# Patient Record
Sex: FEMALE | Race: BLACK OR AFRICAN AMERICAN | NOT HISPANIC OR LATINO | Employment: UNEMPLOYED | URBAN - METROPOLITAN AREA
[De-identification: names, ages, dates, MRNs, and addresses within clinical notes are randomized per-mention and may not be internally consistent; named-entity substitution may affect disease eponyms.]

---

## 2021-09-25 ENCOUNTER — APPOINTMENT (OUTPATIENT)
Dept: ULTRASOUND IMAGING | Facility: HOSPITAL | Age: 26
End: 2021-09-25

## 2021-09-25 ENCOUNTER — HOSPITAL ENCOUNTER (EMERGENCY)
Facility: HOSPITAL | Age: 26
Discharge: HOME | End: 2021-09-25
Attending: EMERGENCY MEDICINE | Admitting: EMERGENCY MEDICINE

## 2021-09-25 VITALS
TEMPERATURE: 98 F | WEIGHT: 186.29 LBS | RESPIRATION RATE: 18 BRPM | HEART RATE: 87 BPM | SYSTOLIC BLOOD PRESSURE: 106 MMHG | OXYGEN SATURATION: 98 % | DIASTOLIC BLOOD PRESSURE: 67 MMHG | HEIGHT: 61 IN | BODY MASS INDEX: 35.17 KG/M2

## 2021-09-25 DIAGNOSIS — N93.9 VAGINAL SPOTTING: ICD-10-CM

## 2021-09-25 DIAGNOSIS — Z3A.21 21 WEEKS GESTATION OF PREGNANCY: Primary | ICD-10-CM

## 2021-09-25 LAB
ABO GROUP BLD: NORMAL
ALBUMIN SERPL-MCNC: 3.2 G/DL (ref 3.5–5.2)
ALBUMIN/GLOB SERPL: 1.3 G/DL
ALP SERPL-CCNC: 92 U/L (ref 39–117)
ALT SERPL W P-5'-P-CCNC: 10 U/L (ref 1–33)
ANION GAP SERPL CALCULATED.3IONS-SCNC: 11.4 MMOL/L (ref 5–15)
AST SERPL-CCNC: 12 U/L (ref 1–32)
BASOPHILS # BLD AUTO: 0.02 10*3/MM3 (ref 0–0.2)
BASOPHILS NFR BLD AUTO: 0.2 % (ref 0–1.5)
BILIRUB SERPL-MCNC: 0.2 MG/DL (ref 0–1.2)
BILIRUB UR QL STRIP: NEGATIVE
BUN SERPL-MCNC: 9 MG/DL (ref 6–20)
BUN/CREAT SERPL: 24.3 (ref 7–25)
C TRACH RRNA CVX QL NAA+PROBE: NOT DETECTED
CALCIUM SPEC-SCNC: 8.6 MG/DL (ref 8.6–10.5)
CHLORIDE SERPL-SCNC: 105 MMOL/L (ref 98–107)
CLARITY UR: ABNORMAL
CO2 SERPL-SCNC: 20.6 MMOL/L (ref 22–29)
COLOR UR: YELLOW
CREAT SERPL-MCNC: 0.37 MG/DL (ref 0.57–1)
DEPRECATED RDW RBC AUTO: 48.9 FL (ref 37–54)
EOSINOPHIL # BLD AUTO: 0.11 10*3/MM3 (ref 0–0.4)
EOSINOPHIL NFR BLD AUTO: 1.2 % (ref 0.3–6.2)
ERYTHROCYTE [DISTWIDTH] IN BLOOD BY AUTOMATED COUNT: 15.1 % (ref 12.3–15.4)
GFR SERPL CREATININE-BSD FRML MDRD: >150 ML/MIN/1.73
GLOBULIN UR ELPH-MCNC: 2.5 GM/DL
GLUCOSE SERPL-MCNC: 106 MG/DL (ref 65–99)
GLUCOSE UR STRIP-MCNC: NEGATIVE MG/DL
HCG INTACT+B SERPL-ACNC: 3194 MIU/ML
HCT VFR BLD AUTO: 33.8 % (ref 34–46.6)
HGB BLD-MCNC: 11.4 G/DL (ref 12–15.9)
HGB UR QL STRIP.AUTO: NEGATIVE
HOLD SPECIMEN: NORMAL
HOLD SPECIMEN: NORMAL
IMM GRANULOCYTES # BLD AUTO: 0.06 10*3/MM3 (ref 0–0.05)
IMM GRANULOCYTES NFR BLD AUTO: 0.7 % (ref 0–0.5)
KETONES UR QL STRIP: NEGATIVE
LEUKOCYTE ESTERASE UR QL STRIP.AUTO: NEGATIVE
LYMPHOCYTES # BLD AUTO: 1.61 10*3/MM3 (ref 0.7–3.1)
LYMPHOCYTES NFR BLD AUTO: 17.9 % (ref 19.6–45.3)
MCH RBC QN AUTO: 30.2 PG (ref 26.6–33)
MCHC RBC AUTO-ENTMCNC: 33.7 G/DL (ref 31.5–35.7)
MCV RBC AUTO: 89.7 FL (ref 79–97)
MONOCYTES # BLD AUTO: 0.63 10*3/MM3 (ref 0.1–0.9)
MONOCYTES NFR BLD AUTO: 7 % (ref 5–12)
N GONORRHOEA RRNA SPEC QL NAA+PROBE: NOT DETECTED
NEUTROPHILS NFR BLD AUTO: 6.58 10*3/MM3 (ref 1.7–7)
NEUTROPHILS NFR BLD AUTO: 73 % (ref 42.7–76)
NITRITE UR QL STRIP: NEGATIVE
NRBC BLD AUTO-RTO: 0 /100 WBC (ref 0–0.2)
PH UR STRIP.AUTO: 7 [PH] (ref 5–8)
PLATELET # BLD AUTO: 201 10*3/MM3 (ref 140–450)
PMV BLD AUTO: 10.2 FL (ref 6–12)
POTASSIUM SERPL-SCNC: 3.6 MMOL/L (ref 3.5–5.2)
PROT SERPL-MCNC: 5.7 G/DL (ref 6–8.5)
PROT UR QL STRIP: NEGATIVE
RBC # BLD AUTO: 3.77 10*6/MM3 (ref 3.77–5.28)
RH BLD: POSITIVE
SODIUM SERPL-SCNC: 137 MMOL/L (ref 136–145)
SP GR UR STRIP: 1.02 (ref 1–1.03)
UROBILINOGEN UR QL STRIP: ABNORMAL
WBC # BLD AUTO: 9.01 10*3/MM3 (ref 3.4–10.8)
WHOLE BLOOD HOLD SPECIMEN: NORMAL
WHOLE BLOOD HOLD SPECIMEN: NORMAL

## 2021-09-25 PROCEDURE — 76815 OB US LIMITED FETUS(S): CPT

## 2021-09-25 PROCEDURE — 86901 BLOOD TYPING SEROLOGIC RH(D): CPT | Performed by: EMERGENCY MEDICINE

## 2021-09-25 PROCEDURE — 81003 URINALYSIS AUTO W/O SCOPE: CPT | Performed by: EMERGENCY MEDICINE

## 2021-09-25 PROCEDURE — 80053 COMPREHEN METABOLIC PANEL: CPT | Performed by: EMERGENCY MEDICINE

## 2021-09-25 PROCEDURE — 87591 N.GONORRHOEAE DNA AMP PROB: CPT | Performed by: EMERGENCY MEDICINE

## 2021-09-25 PROCEDURE — 85025 COMPLETE CBC W/AUTO DIFF WBC: CPT | Performed by: EMERGENCY MEDICINE

## 2021-09-25 PROCEDURE — 99283 EMERGENCY DEPT VISIT LOW MDM: CPT

## 2021-09-25 PROCEDURE — 87491 CHLMYD TRACH DNA AMP PROBE: CPT | Performed by: EMERGENCY MEDICINE

## 2021-09-25 PROCEDURE — 86900 BLOOD TYPING SEROLOGIC ABO: CPT | Performed by: EMERGENCY MEDICINE

## 2021-09-25 PROCEDURE — 84702 CHORIONIC GONADOTROPIN TEST: CPT | Performed by: EMERGENCY MEDICINE

## 2021-09-25 RX ORDER — SODIUM CHLORIDE 0.9 % (FLUSH) 0.9 %
10 SYRINGE (ML) INJECTION AS NEEDED
Status: DISCONTINUED | OUTPATIENT
Start: 2021-09-25 | End: 2021-09-25 | Stop reason: HOSPADM

## 2021-09-25 NOTE — ED PROVIDER NOTES
"Time: 11:32 AM EDT  Arrived by: private car  Chief Complaint: Vaginal Bleeding  History provided by: Patient  History is limited by: N/A     History of Present Illness:  Patient is a 26 y.o. year old female that presents to the emergency department with a mild amount of intermittent vaginal bleeding that began last night at 2000. The bleeding is described as \"spotting.\"  About 5 hours after the bleeding began, she began to experience abdominal cramping. She has had a fever and vaginal discharge but no nausea, vomiting, or diarrhea.    Pt is currently in rehab. She was recently diagnosed with PID and was given 1 injection of antibiotics. The patient had a positive pregnancy test in May 2021 but no ultrasound.      Vaginal Bleeding - Pregnant  Quality:  Spotting  Severity:  Mild  Duration: Since 2000 last night.  Timing:  Intermittent  Pregnancy confirmed by ultrasound: no    Associated symptoms: abdominal pain (cramping), fever and vaginal discharge    Associated symptoms: no nausea        Patient Care Team  Primary Care Provider: Provider, No Known    Past Medical History:     Allergies   Allergen Reactions   • Celexa [Citalopram] Anaphylaxis and Hives     History reviewed. No pertinent past medical history.  History reviewed. No pertinent surgical history.  History reviewed. No pertinent family history.    Home Medications:  Prior to Admission medications    Not on File        Social History:   Social History     Tobacco Use   • Smoking status: Not on file   Substance Use Topics   • Alcohol use: Not on file   • Drug use: Not on file     Recent travel: not applicable     Review of Systems:  Review of Systems   Constitutional: Positive for fever. Negative for chills.   HENT: Negative for congestion, ear pain and sore throat.    Eyes: Negative for pain.   Respiratory: Negative for cough, chest tightness and shortness of breath.    Cardiovascular: Negative for chest pain.   Gastrointestinal: Positive for abdominal pain " "(cramping). Negative for diarrhea, nausea and vomiting.   Genitourinary: Positive for vaginal bleeding and vaginal discharge. Negative for flank pain and hematuria.   Musculoskeletal: Negative for joint swelling.   Skin: Negative for pallor.   Neurological: Negative for seizures and headaches.   All other systems reviewed and are negative.       Physical Exam:  /67   Pulse 87   Temp 98 °F (36.7 °C) (Oral)   Resp 18   Ht 154.9 cm (61\")   Wt 84.5 kg (186 lb 4.6 oz)   LMP 04/26/2021 (Approximate)   SpO2 98%   BMI 35.20 kg/m²     Physical Exam  Vitals and nursing note reviewed.   Constitutional:       General: She is not in acute distress.     Appearance: Normal appearance. She is not toxic-appearing.   HENT:      Head: Normocephalic and atraumatic.      Mouth/Throat:      Mouth: Mucous membranes are moist.   Eyes:      Extraocular Movements: Extraocular movements intact.      Pupils: Pupils are equal, round, and reactive to light.   Cardiovascular:      Rate and Rhythm: Normal rate and regular rhythm.      Pulses: Normal pulses.      Heart sounds: Normal heart sounds.   Pulmonary:      Effort: Pulmonary effort is normal. No respiratory distress.      Breath sounds: Normal breath sounds.   Abdominal:      General: Abdomen is flat.      Palpations: Abdomen is soft.      Tenderness: There is no abdominal tenderness.      Comments: Gravid uterus to the umbilicus.    Musculoskeletal:         General: Normal range of motion.      Cervical back: Normal range of motion and neck supple.   Skin:     General: Skin is warm and dry.   Neurological:      Mental Status: She is alert and oriented to person, place, and time. Mental status is at baseline.                Medications in the Emergency Department:  Medications - No data to display     Labs  Lab Results (last 24 hours)     Procedure Component Value Units Date/Time    CBC & Differential [[id number]]  (Abnormal) Collected: 09/25/21 1059    Specimen: Blood Updated: " 09/25/21 1117    Narrative:      The following orders were created for panel order CBC & Differential.  Procedure                               Abnormality         Status                     ---------                               -----------         ------                     CBC Auto Differential[[id number]]        Abnormal            Final result                 Please view results for these tests on the individual orders.    hCG, Quantitative, Pregnancy [[id number]] Collected: 09/25/21 1059    Specimen: Blood Updated: 09/25/21 1156     HCG Quantitative 3,194.00 mIU/mL     Narrative:      HCG Ranges by Gestational Age    Females - non-pregnant premenopausal   </= 1mIU/mL HCG  Females - postmenopausal               </= 7mIU/mL HCG    3 Weeks       5.4   -      72 mIU/mL  4 Weeks      10.2   -     708 mIU/mL  5 Weeks       217   -   8,245 mIU/mL  6 Weeks       152   -  32,177 mIU/mL  7 Weeks     4,059   - 153,767 mIU/mL  8 Weeks    31,366   - 149,094 mIU/mL  9 Weeks    59,109   - 135,901 mIU/mL  10 Weeks   44,186   - 170,409 mIU/mL  12 Weeks   27,107   - 201,615 mIU/mL  14 Weeks   24,302   -  93,646 mIU/mL  15 Weeks   12,540   -  69,747 mIU/mL  16 Weeks    8,904   -  55,332 mIU/mL  17 Weeks    8,240   -  51,793 mIU/mL  18 Weeks    9,649   -  55,271 mIU/mL    Results may be falsely decreased if patient taking Biotin.      CBC Auto Differential [[id number]]  (Abnormal) Collected: 09/25/21 1059    Specimen: Blood Updated: 09/25/21 1117     WBC 9.01 10*3/mm3      RBC 3.77 10*6/mm3      Hemoglobin 11.4 g/dL      Hematocrit 33.8 %      MCV 89.7 fL      MCH 30.2 pg      MCHC 33.7 g/dL      RDW 15.1 %      RDW-SD 48.9 fl      MPV 10.2 fL      Platelets 201 10*3/mm3      Neutrophil % 73.0 %      Lymphocyte % 17.9 %      Monocyte % 7.0 %      Eosinophil % 1.2 %      Basophil % 0.2 %      Immature Grans % 0.7 %      Neutrophils, Absolute 6.58 10*3/mm3      Lymphocytes, Absolute 1.61 10*3/mm3      Monocytes, Absolute 0.63  10*3/mm3      Eosinophils, Absolute 0.11 10*3/mm3      Basophils, Absolute 0.02 10*3/mm3      Immature Grans, Absolute 0.06 10*3/mm3      nRBC 0.0 /100 WBC     Comprehensive Metabolic Panel [[id number]]  (Abnormal) Collected: 09/25/21 1059    Specimen: Blood Updated: 09/25/21 1153     Glucose 106 mg/dL      BUN 9 mg/dL      Creatinine 0.37 mg/dL      Sodium 137 mmol/L      Potassium 3.6 mmol/L      Chloride 105 mmol/L      CO2 20.6 mmol/L      Calcium 8.6 mg/dL      Total Protein 5.7 g/dL      Albumin 3.20 g/dL      ALT (SGPT) 10 U/L      AST (SGOT) 12 U/L      Alkaline Phosphatase 92 U/L      Total Bilirubin 0.2 mg/dL      eGFR  African Amer >150 mL/min/1.73      Globulin 2.5 gm/dL      A/G Ratio 1.3 g/dL      BUN/Creatinine Ratio 24.3     Anion Gap 11.4 mmol/L     Narrative:      GFR Normal >60  Chronic Kidney Disease <60  Kidney Failure <15      Urinalysis With Culture If Indicated - Urine, Clean Catch [[id number]]  (Abnormal) Collected: 09/25/21 1129    Specimen: Urine, Clean Catch Updated: 09/25/21 1204     Color, UA Yellow     Appearance, UA Cloudy     pH, UA 7.0     Specific Gravity, UA 1.018     Glucose, UA Negative     Ketones, UA Negative     Bilirubin, UA Negative     Blood, UA Negative     Protein, UA Negative     Leuk Esterase, UA Negative     Nitrite, UA Negative     Urobilinogen, UA 0.2 E.U./dL    Narrative:      Urine microscopic not indicated.    Chlamydia trachomatis, Neisseria gonorrhoeae, PCR - Urine, Urine, Clean Catch [[id number]]  (Normal) Collected: 09/25/21 1130    Specimen: Urine, Clean Catch Updated: 09/25/21 1439     Chlamydia DNA by PCR Not Detected     Neisseria gonorrhoeae by PCR Not Detected           Imaging:  US Ob Limited 1 + Fetuses    Result Date: 9/25/2021  PROCEDURE: US OB LIMITED 1 + FETUSES  COMPARISON:  None INDICATIONS: preg, pain, vag bleed  TECHNIQUE: A complete sonographic examination was performed for obstetrical and fetal evaluation.   FINDINGS:  FETAL NUMBER: Single.   POSITION: Cephalic transitioning to transverse by the in the exam.  AMNIOTIC FLUID VOLUME: Normal for age with a DVP of 7.3 cm. PLACENTA LOCATION: Fundal BIPARIETAL DIAMETER: 5.1 cm equal to 21 weeks 4 days HEAD CIRCUMFERENCE: 17.7 cm equal to 20 weeks 2 days ABD CIRCUMFERENCE: 16.1 cm equal to 21 weeks 2 days FEMUR LENGTH: 3.5 cm equal to 21 weeks 0 days ESTIMATED FETAL WEIGHT: 391 g +/-57 g or 0 lb 14 oz +/-2 oz  HEART RATE: 143 bpm FACE AND LIPS: Not adequately identified FETAL ANATOMY: The following structures are normal for fetal age unless specified below:  Ventricles, posterior fossa, spine, heart, LVOT, RVOT, thorax, abdomen, cord, stomach, kidneys, and bladder. ABNORMALITIES/OTHER: None. CLINICAL GA: Unknown. CLINICAL GIBRAN: Unknown. ULTRASOUND GA: 21 weeks 1 day ULTRASOUND GIBRAN: 2/4/2022  CONCLUSION: Unremarkable fetal sonogram     KAUSHIK MERRILL MD       Electronically Signed and Approved By: KAUSHIK MERRILL MD on 9/25/2021 at 12:29               Procedures:  Procedures    Progress  ED Course as of Sep 25 2036   Sat Sep 25, 2021   1332 Upon reevaluation, the patient is in stable condition.     [LG]      ED Course User Index  [LG] Irvign Paulson                            Medical Decision Making:  MDM  Number of Diagnoses or Management Options     Amount and/or Complexity of Data Reviewed  Clinical lab tests: reviewed  Tests in the radiology section of CPT®: reviewed  Review and summarize past medical records: yes (Pt has no significant past visits or evidence of prior prenatal care. )         Final diagnoses:   21 weeks gestation of pregnancy   Vaginal spotting        Disposition:  ED Disposition     ED Disposition Condition Comment    Discharge Stable           This medical record created using voice recognition software and may contain unintended errors.    Documentation assistance provided by Irving Paulson acting as scribe for Rio Gregorio DO. Information recorded by the scribe was done at my direction  and has been verified and validated by me.        Irving Paulson  09/25/21 1322       Rio Gregorio DO  09/25/21 2036

## 2023-10-30 LAB
EXTERNAL HEMATOCRIT: 40 %
EXTERNAL HEMOGLOBIN: 13.5 G/DL
EXTERNAL HEPATITIS B SURFACE ANTIGEN: NEGATIVE
EXTERNAL PLATELET COUNT: 286 K/ΜL
EXTERNAL SYPHILIS RPR SCREEN: NORMAL
HCV AB S/CO SERPL IA: NORMAL
HIV 1+2 AB+HIV1 P24 AG SERPL QL IA: NORMAL

## 2023-12-27 LAB
BUPRENORPHINE SERPL-MCNC: NEGATIVE NG/ML
CANNABINOIDS SERPL QL: NEGATIVE
COCAINE SERPL CFM-MCNC: NEGATIVE NG/ML
EXTERNAL AMPHETAMINE SCREEN URINE: NEGATIVE
EXTERNAL BARBITURATE SCREEN URINE: NEGATIVE
EXTERNAL BENZODIAZEPINE SCREEN URINE: NEGATIVE
EXTERNAL THYROID STIMULATING HORMONE: 1.72 M[IU]/ML
HCV AB S/CO SERPL IA: NORMAL
OPIATES UR QL: NEGATIVE
OXYCODONE UR QL SCN: NEGATIVE

## 2024-01-03 ENCOUNTER — HOSPITAL ENCOUNTER (OUTPATIENT)
Facility: HOSPITAL | Age: 29
Discharge: HOME | End: 2024-01-03
Attending: OBSTETRICS & GYNECOLOGY | Admitting: OBSTETRICS & GYNECOLOGY
Payer: MEDICAID

## 2024-01-03 VITALS
SYSTOLIC BLOOD PRESSURE: 114 MMHG | DIASTOLIC BLOOD PRESSURE: 52 MMHG | HEART RATE: 90 BPM | OXYGEN SATURATION: 100 % | RESPIRATION RATE: 18 BRPM

## 2024-01-03 LAB
ALBUMIN SERPL-MCNC: 3.1 G/DL (ref 3.5–5.2)
ALBUMIN/GLOB SERPL: 1 G/DL
ALP SERPL-CCNC: 107 U/L (ref 39–117)
ALT SERPL W P-5'-P-CCNC: 17 U/L (ref 1–33)
ANION GAP SERPL CALCULATED.3IONS-SCNC: 9 MMOL/L (ref 5–15)
AST SERPL-CCNC: 18 U/L (ref 1–32)
BILIRUB SERPL-MCNC: <0.2 MG/DL (ref 0–1.2)
BUN SERPL-MCNC: 3 MG/DL (ref 6–20)
BUN/CREAT SERPL: 7 (ref 7–25)
CALCIUM SPEC-SCNC: 8.7 MG/DL (ref 8.6–10.5)
CHLORIDE SERPL-SCNC: 102 MMOL/L (ref 98–107)
CO2 SERPL-SCNC: 23 MMOL/L (ref 22–29)
CREAT SERPL-MCNC: 0.43 MG/DL (ref 0.57–1)
CREAT UR-MCNC: 95.9 MG/DL
DEPRECATED RDW RBC AUTO: 44.5 FL (ref 37–54)
EGFRCR SERPLBLD CKD-EPI 2021: 136.1 ML/MIN/1.73
ERYTHROCYTE [DISTWIDTH] IN BLOOD BY AUTOMATED COUNT: 13.7 % (ref 12.3–15.4)
GLOBULIN UR ELPH-MCNC: 3.1 GM/DL
GLUCOSE SERPL-MCNC: 75 MG/DL (ref 65–99)
HCT VFR BLD AUTO: 31.1 % (ref 34–46.6)
HGB BLD-MCNC: 10.2 G/DL (ref 12–15.9)
MCH RBC QN AUTO: 29.1 PG (ref 26.6–33)
MCHC RBC AUTO-ENTMCNC: 32.8 G/DL (ref 31.5–35.7)
MCV RBC AUTO: 88.9 FL (ref 79–97)
PLATELET # BLD AUTO: 236 10*3/MM3 (ref 140–450)
PMV BLD AUTO: 10.3 FL (ref 6–12)
POTASSIUM SERPL-SCNC: 3.7 MMOL/L (ref 3.5–5.2)
PROT ?TM UR-MCNC: 6.5 MG/DL
PROT SERPL-MCNC: 6.2 G/DL (ref 6–8.5)
PROT/CREAT UR: 0.07 MG/G{CREAT}
RBC # BLD AUTO: 3.5 10*6/MM3 (ref 3.77–5.28)
SODIUM SERPL-SCNC: 134 MMOL/L (ref 136–145)
WBC NRBC COR # BLD AUTO: 9.19 10*3/MM3 (ref 3.4–10.8)

## 2024-01-03 PROCEDURE — 85027 COMPLETE CBC AUTOMATED: CPT | Performed by: OBSTETRICS & GYNECOLOGY

## 2024-01-03 PROCEDURE — 80053 COMPREHEN METABOLIC PANEL: CPT | Performed by: OBSTETRICS & GYNECOLOGY

## 2024-01-03 PROCEDURE — 84156 ASSAY OF PROTEIN URINE: CPT | Performed by: OBSTETRICS & GYNECOLOGY

## 2024-01-03 PROCEDURE — G0463 HOSPITAL OUTPT CLINIC VISIT: HCPCS

## 2024-01-03 PROCEDURE — 82570 ASSAY OF URINE CREATININE: CPT | Performed by: OBSTETRICS & GYNECOLOGY

## 2024-01-03 NOTE — LETTER
January 3, 2024     Patient: Vicky Melendrez   YOB: 1995   Date of Visit: 1/3/2024       To Whom It May Concern:    Vicky Melendrez was seen in Labor and Delivery today and discharged.           Sincerely,  Gemma ANDREWS

## 2024-01-03 NOTE — H&P
VAISHNAVI Eduardo  Obstetric History and Physical    Chief Complaint   Patient presents with    swelling in legs     Swelling in lower legs for last two weeks.        Subjective     HPI:    Patient is a 28 y.o. female  currently at 25w1d, who presents to OB ED with/for edema. This has been going on for about two weeks. She was told to use compression socks but there has been no improvement. She denies pain or redness in either leg. There are no fetal complaints. Baby is moving well. She denies headache, RUQ pain.    She has been seeing physician in [facility] for her prenatal care.  The pregnancy has been complicated by substance abuse (sober since pregnancy confirmed). She lives in a facility for women with drug abuse.    The following portions of the patients history were reviewed and updated as appropriate:   current medications, allergies, past medical history, past surgical history, past family history, past social history and current problem list.     Prenatal Information:  Prenatal Results       Initial Prenatal Labs       Test Value Reference Range Date Time    ABO  O   21 1129    Rh  Positive   21 1129    Gonorrhea  Not Detected  Not Detected  21 1130    Chlamydia  Not Detected  Not Detected  21 1130                2nd and 3rd Trimester       Test Value Reference Range Date Time    Hemoglobin (repeated)  10.2 g/dL 12.0 - 15.9 24 0948    Hematocrit (repeated)  31.1 % 34.0 - 46.6 24 0948    Platelets   236 10*3/mm3 140 - 450 24 0948                Past OB History:     OB History    Para Term  AB Living   6 5 4 1 0 5   SAB IAB Ectopic Molar Multiple Live Births   0 0 0 0 0 5      # Outcome Date GA Lbr Braden/2nd Weight Sex Delivery Anes PTL Lv   6 Current            5 Term 22 38w0d  2722 g (6 lb) M Vag-Spont   MALA   4  21 32w0d  2353 g (5 lb 3 oz) M Vag-Spont   MALA      Complications:  labor   3 Term 19 38w0d  2608 g (5 lb  12 oz) F Vag-Spont   MALA   2 Term 07/31/17 38w0d  3175 g (7 lb) M Vag-Spont   MALA   1 Term 05/08/16 38w0d  3076 g (6 lb 12.5 oz) F Vag-Spont   MALA       Past Medical History: Past Medical History:   Diagnosis Date    Anxiety     Depression     Genital herpes     GERD (gastroesophageal reflux disease)     Substance abuse       Past Surgical History History reviewed. No pertinent surgical history.   Family History: Family History   Problem Relation Age of Onset    Hypertension Mother     Diabetes Sister       Social History:  reports that she has been smoking cigarettes. She has been smoking an average of .5 packs per day. She has been exposed to tobacco smoke. She has never used smokeless tobacco.   reports that she does not currently use alcohol.   reports that she does not currently use drugs.        General ROS: Pertinent items are noted in HPI  Home Medications:  Prenatal, bacitracin, buprenorphine, busPIRone, cefdinir, diphenhydrAMINE, docusate sodium, famotidine, ferrous sulfate, fluticasone, hydrOXYzine pamoate, omeprazole, ondansetron ODT, and sertraline    Allergies:  Allergies   Allergen Reactions    Celexa [Citalopram] Anaphylaxis and Hives       Objective       Vital Signs Range for the last 24 hours  Temperature:     Temp Source:     BP: BP: (114-130)/(52-54) 114/52   Pulse: Heart Rate:  [76-90] 90   Respirations: Resp:  [18] 18   SPO2: SpO2:  [98 %-100 %] 100 %     Physical Examination:   General appearance - alert, well appearing, and in no distress  Mental status - alert, oriented to person, place, and time  Chest - clear to auscultation  Heart - normal rate, regular rhythm  Abdomen - soft, nontender, nondistended  Neurological - alert, oriented, normal speech  Extremities - Edema 2+ to mid calf; DTR 2+  Skin - normal coloration and turgor, no suspicious skin lesions noted     Odell: no contractions   NST: FHR 150s      Lab Results (last 24 hours)       Procedure Component Value Units Date/Time    CBC  (No Diff) [[id number]]  (Abnormal) Collected: 01/03/24 0948    Specimen: Blood from Arm, Right Updated: 01/03/24 1021     WBC 9.19 10*3/mm3      RBC 3.50 10*6/mm3      Hemoglobin 10.2 g/dL      Hematocrit 31.1 %      MCV 88.9 fL      MCH 29.1 pg      MCHC 32.8 g/dL      RDW 13.7 %      RDW-SD 44.5 fl      MPV 10.3 fL      Platelets 236 10*3/mm3     Comprehensive Metabolic Panel [[id number]]  (Abnormal) Collected: 01/03/24 0948    Specimen: Blood from Arm, Right Updated: 01/03/24 1050     Glucose 75 mg/dL      BUN 3 mg/dL      Creatinine 0.43 mg/dL      Sodium 134 mmol/L      Potassium 3.7 mmol/L      Chloride 102 mmol/L      CO2 23.0 mmol/L      Calcium 8.7 mg/dL      Total Protein 6.2 g/dL      Albumin 3.1 g/dL      ALT (SGPT) 17 U/L      AST (SGOT) 18 U/L      Alkaline Phosphatase 107 U/L      Total Bilirubin <0.2 mg/dL      Globulin 3.1 gm/dL      A/G Ratio 1.0 g/dL      BUN/Creatinine Ratio 7.0     Anion Gap 9.0 mmol/L      eGFR 136.1 mL/min/1.73     Narrative:      GFR Normal >60  Chronic Kidney Disease <60  Kidney Failure <15      Protein / Creatinine Ratio, Urine - Urine, Clean Catch [[id number]] Collected: 01/03/24 1035    Specimen: Urine, Clean Catch Updated: 01/03/24 1114     Creatinine, Urine 95.9 mg/dL      Total Protein, Urine 6.5 mg/dL      Protein/Creatinine Ratio, Urine 0.07            Assessment & Plan     Assessment:  -  Intrauterine pregnancy at 25w1d gestation who presents for: 2 wks of lower extremity edema. No relief with compression. She has no other concerns     Plan:  Edema  No other symptoms and exam negative but for bilateral edema  Labs all normal  No sign of DVT either leg  Recommend continue with compression hose and elevation when sitting or lying down    2.  Patient does state she will deliver here even though she gets care in [place]. We requested her records and these have been scanned to media tab.       - Plan of care has been reviewed with patient and patient agrees.   -  Risks, benefits of treatment plan have been discussed.  - All questions have been answered.        Electronically signed by Yue Persaud MD, 01/03/24, 12:04 PM EST.

## 2024-01-03 NOTE — NURSING NOTE
Pt to unit with c/o swelling in lower legs for 2 weeks. Pt denies any problems with this pregnancy. Pt denies any abdominal pain, discomfort or vaginal discharge. Pt reports + fetal movement. Pt reports she receives prenatal care in [place].

## 2024-01-03 NOTE — NON STRESS TEST
Obstetrical Non-stress Test Interpretation     Name:  Vicky Melendrez  MRN: [id number]    28 y.o. female  at 25w1d    Indication: swelling in legs for 2 weeks      Fetal Assessment  Fetal Movement: active  Fetal HR Assessment Method: external  Fetal HR (beats/min): 140  Fetal HR Baseline: normal range  Fetal HR Variability: other (see comments) (appropriate for gestational age)  Additional Documentation:  (appropriate for gestational age)    /52 (BP Location: Right arm, Patient Position: Lying)   Pulse 90   Resp 18   SpO2 100%     Reason for test: OB Triage (swelling in legs for 2 weeks)  Date of Test: 1/3/2024  Time frame of test: 8647=9456  RN NST Interpretation:  (appropritate for gestational age)      Gemma Barry RN  1/3/2024  12:27 EST

## 2024-01-05 ENCOUNTER — TRANSCRIBE ORDERS (OUTPATIENT)
Dept: ADMINISTRATIVE | Facility: HOSPITAL | Age: 29
End: 2024-01-05
Payer: MEDICAID

## 2024-01-05 ENCOUNTER — LAB (OUTPATIENT)
Dept: LAB | Facility: HOSPITAL | Age: 29
End: 2024-01-05
Payer: MEDICAID

## 2024-01-05 DIAGNOSIS — Z79.899 ENCOUNTER FOR LONG-TERM (CURRENT) USE OF OTHER MEDICATIONS: Primary | ICD-10-CM

## 2024-01-05 DIAGNOSIS — Z79.899 ENCOUNTER FOR LONG-TERM (CURRENT) USE OF OTHER MEDICATIONS: ICD-10-CM

## 2024-01-05 LAB
ALBUMIN SERPL-MCNC: 3.6 G/DL (ref 3.5–5.2)
ALBUMIN/GLOB SERPL: 1.1 G/DL
ALP SERPL-CCNC: 118 U/L (ref 39–117)
ALT SERPL W P-5'-P-CCNC: 23 U/L (ref 1–33)
ANION GAP SERPL CALCULATED.3IONS-SCNC: 13.2 MMOL/L (ref 5–15)
AST SERPL-CCNC: 22 U/L (ref 1–32)
BASOPHILS # BLD AUTO: 0.02 10*3/MM3 (ref 0–0.2)
BASOPHILS NFR BLD AUTO: 0.2 % (ref 0–1.5)
BILIRUB SERPL-MCNC: <0.2 MG/DL (ref 0–1.2)
BUN SERPL-MCNC: 5 MG/DL (ref 6–20)
BUN/CREAT SERPL: 10.9 (ref 7–25)
CALCIUM SPEC-SCNC: 9.5 MG/DL (ref 8.6–10.5)
CHLORIDE SERPL-SCNC: 102 MMOL/L (ref 98–107)
CO2 SERPL-SCNC: 21.8 MMOL/L (ref 22–29)
CREAT SERPL-MCNC: 0.46 MG/DL (ref 0.57–1)
DEPRECATED RDW RBC AUTO: 38.7 FL (ref 37–54)
EGFRCR SERPLBLD CKD-EPI 2021: 133.9 ML/MIN/1.73
EOSINOPHIL # BLD AUTO: 0.21 10*3/MM3 (ref 0–0.4)
EOSINOPHIL NFR BLD AUTO: 1.9 % (ref 0.3–6.2)
ERYTHROCYTE [DISTWIDTH] IN BLOOD BY AUTOMATED COUNT: 12.5 % (ref 12.3–15.4)
GLOBULIN UR ELPH-MCNC: 3.3 GM/DL
GLUCOSE SERPL-MCNC: 97 MG/DL (ref 65–99)
HCT VFR BLD AUTO: 31.7 % (ref 34–46.6)
HGB BLD-MCNC: 10.5 G/DL (ref 12–15.9)
IMM GRANULOCYTES # BLD AUTO: 0.05 10*3/MM3 (ref 0–0.05)
IMM GRANULOCYTES NFR BLD AUTO: 0.4 % (ref 0–0.5)
LYMPHOCYTES # BLD AUTO: 2.27 10*3/MM3 (ref 0.7–3.1)
LYMPHOCYTES NFR BLD AUTO: 20.3 % (ref 19.6–45.3)
MCH RBC QN AUTO: 28.3 PG (ref 26.6–33)
MCHC RBC AUTO-ENTMCNC: 33.1 G/DL (ref 31.5–35.7)
MCV RBC AUTO: 85.4 FL (ref 79–97)
MONOCYTES # BLD AUTO: 0.68 10*3/MM3 (ref 0.1–0.9)
MONOCYTES NFR BLD AUTO: 6.1 % (ref 5–12)
NEUTROPHILS NFR BLD AUTO: 7.94 10*3/MM3 (ref 1.7–7)
NEUTROPHILS NFR BLD AUTO: 71.1 % (ref 42.7–76)
NRBC BLD AUTO-RTO: 0.1 /100 WBC (ref 0–0.2)
PLATELET # BLD AUTO: 271 10*3/MM3 (ref 140–450)
PMV BLD AUTO: 10.7 FL (ref 6–12)
POTASSIUM SERPL-SCNC: 3.5 MMOL/L (ref 3.5–5.2)
PROT SERPL-MCNC: 6.9 G/DL (ref 6–8.5)
RBC # BLD AUTO: 3.71 10*6/MM3 (ref 3.77–5.28)
SODIUM SERPL-SCNC: 137 MMOL/L (ref 136–145)
WBC NRBC COR # BLD AUTO: 11.17 10*3/MM3 (ref 3.4–10.8)

## 2024-01-05 PROCEDURE — 80053 COMPREHEN METABOLIC PANEL: CPT

## 2024-01-05 PROCEDURE — 85025 COMPLETE CBC W/AUTO DIFF WBC: CPT

## 2024-01-05 PROCEDURE — 36415 COLL VENOUS BLD VENIPUNCTURE: CPT

## 2024-01-24 LAB
EXTERNAL HEMATOCRIT: 34 %
EXTERNAL HEMOGLOBIN: 11.4 G/DL
PLATELET # BLD AUTO: 231 10*3/UL

## 2024-02-28 ENCOUNTER — TELEPHONE (OUTPATIENT)
Dept: OBSTETRICS AND GYNECOLOGY | Facility: CLINIC | Age: 29
End: 2024-02-28
Payer: MEDICAID

## 2024-02-29 ENCOUNTER — TELEPHONE (OUTPATIENT)
Dept: OBSTETRICS AND GYNECOLOGY | Facility: CLINIC | Age: 29
End: 2024-02-29

## 2024-02-29 ENCOUNTER — INITIAL PRENATAL (OUTPATIENT)
Dept: OBSTETRICS AND GYNECOLOGY | Facility: CLINIC | Age: 29
End: 2024-02-29
Payer: MEDICAID

## 2024-02-29 VITALS — DIASTOLIC BLOOD PRESSURE: 56 MMHG | WEIGHT: 227.2 LBS | SYSTOLIC BLOOD PRESSURE: 108 MMHG | BODY MASS INDEX: 42.93 KG/M2

## 2024-02-29 DIAGNOSIS — Z34.93 PRENATAL CARE, THIRD TRIMESTER: Primary | ICD-10-CM

## 2024-02-29 DIAGNOSIS — Z3A.33 33 WEEKS GESTATION OF PREGNANCY: ICD-10-CM

## 2024-02-29 DIAGNOSIS — R33.9 INCOMPLETE EMPTYING OF BLADDER: ICD-10-CM

## 2024-02-29 DIAGNOSIS — Z23 NEED FOR TDAP VACCINATION: ICD-10-CM

## 2024-02-29 LAB
BILIRUB BLD-MCNC: NEGATIVE MG/DL
CLARITY, POC: CLEAR
COLOR UR: YELLOW
GLUCOSE UR STRIP-MCNC: NEGATIVE MG/DL
KETONES UR QL: NEGATIVE
LEUKOCYTE EST, POC: NEGATIVE
NITRITE UR-MCNC: NEGATIVE MG/ML
PH UR: 6 [PH] (ref 5–8)
PROT UR STRIP-MCNC: NEGATIVE MG/DL
RBC # UR STRIP: NEGATIVE /UL
SP GR UR: 1.02 (ref 1–1.03)
UROBILINOGEN UR QL: NORMAL

## 2024-02-29 RX ORDER — ONDANSETRON 4 MG/1
4 TABLET, ORALLY DISINTEGRATING ORAL EVERY 8 HOURS PRN
Qty: 30 TABLET | Refills: 4 | Status: SHIPPED | OUTPATIENT
Start: 2024-02-29

## 2024-02-29 RX ORDER — DIPHENHYDRAMINE HYDROCHLORIDE 25 MG/1
25 CAPSULE ORAL
Qty: 30 CAPSULE | Refills: 3 | Status: SHIPPED | OUTPATIENT
Start: 2024-02-29

## 2024-02-29 RX ORDER — DOXYLAMINE SUCCINATE 25 MG/1
25 TABLET ORAL
Qty: 30 TABLET | Refills: 3 | Status: SHIPPED | OUTPATIENT
Start: 2024-02-29

## 2024-02-29 RX ORDER — NICOTINE 21 MG/24HR
PATCH, TRANSDERMAL 24 HOURS TRANSDERMAL
COMMUNITY
Start: 2024-01-15

## 2024-02-29 RX ORDER — ALBUTEROL SULFATE 90 UG/1
AEROSOL, METERED RESPIRATORY (INHALATION) EVERY 6 HOURS SCHEDULED
COMMUNITY

## 2024-02-29 RX ORDER — GUAIFENESIN 600 MG/1
TABLET, EXTENDED RELEASE ORAL
COMMUNITY
Start: 2024-01-12

## 2024-02-29 RX ORDER — GUAIFENESIN, DEXTROMETHORPHAN HBR 600; 30 MG/1; MG/1
TABLET ORAL
COMMUNITY
Start: 2023-11-13

## 2024-02-29 RX ORDER — PROMETHAZINE HYDROCHLORIDE 25 MG/1
1 TABLET ORAL
COMMUNITY
Start: 2023-12-07

## 2024-02-29 RX ORDER — PSEUDOEPHED/ACETAMINOPH/DIPHEN 30MG-500MG
TABLET ORAL
COMMUNITY
Start: 2024-02-21

## 2024-02-29 RX ORDER — HYDROXYZINE PAMOATE 25 MG/1
50 CAPSULE ORAL 3 TIMES DAILY PRN
Qty: 90 CAPSULE | Refills: 4 | Status: SHIPPED | OUTPATIENT
Start: 2024-02-29 | End: 2024-05-29

## 2024-02-29 RX ORDER — NALOXONE HYDROCHLORIDE 4 MG/.1ML
SPRAY NASAL
COMMUNITY
Start: 2024-01-17

## 2024-02-29 RX ORDER — DOCUSATE SODIUM 100 MG/1
200 CAPSULE, LIQUID FILLED ORAL 2 TIMES DAILY
Qty: 90 CAPSULE | Refills: 3 | Status: SHIPPED | OUTPATIENT
Start: 2024-02-29

## 2024-02-29 RX ORDER — DOXYLAMINE SUCCINATE 25 MG/1
25 TABLET ORAL
COMMUNITY
Start: 2024-02-09 | End: 2024-02-29 | Stop reason: SDUPTHER

## 2024-02-29 RX ORDER — BUSPIRONE HYDROCHLORIDE 10 MG/1
10 TABLET ORAL 3 TIMES DAILY
Qty: 90 TABLET | Refills: 3 | Status: SHIPPED | OUTPATIENT
Start: 2024-02-29

## 2024-02-29 NOTE — PROGRESS NOTES
"    Transfer OB visit     CC- Here for care of pregnancy        Vicky Melendrez is a 28 y.o. female, , who presents to transfer OB care.  No LMP recorded. Patient is pregnant..   Her GIBRAN is 2024, Date entered prior to episode creation.   Current GA is 33w2d.     She was last seen at [facility] at 28 weeks gestation.  Her pregnancy records are in the patient's chart and have been reviewed .  Her blood type is O+.  Thus far her pregnancy has been complicated by: HSV, anxiety/depression, substance abuse, smoker    OB History    Para Term  AB Living   6 5 4 1 0 5   SAB IAB Ectopic Molar Multiple Live Births   0 0 0 0 0 5      # Outcome Date GA Lbr Braden/2nd Weight Sex Delivery Anes PTL Lv   6 Current            5 Term 22 39w0d  2722 g (6 lb) M Vag-Spont   MALA   4  21 32w0d  2353 g (5 lb 3 oz) M Vag-Spont   MALA      Complications:  labor   3 Term 19 38w0d  2608 g (5 lb 12 oz) F Vag-Spont   MALA   2 Term 17 38w0d  3175 g (7 lb) M Vag-Spont   MALA   1 Term 16 38w0d  3076 g (6 lb 12.5 oz) F Vag-Spont   MALA      Complications: [place]itter delivered by vacuum extraction        Prior obstetric issues: vacuum assisted delivery for G1,  labor for G4 (delivery at 32 weeks)  Patient's past medical history is significant for:  asthma, HSV, anxiety/depression, GERD, hx substance abuse .  Family history of genetic issues (includes FOB): none  Varicella Hx - unknown   Prior testing for Cystic Fibrosis Carrier or Sickle Cell Trait - none  Prepregnancy BMI - Body mass index is 42.93 kg/m².  History of STD: yes HSV  Hx of HSV for patient or partner: yes - last outbreak has been \"years ago\"  Ultrasound Today: no      Additional Pertinent History   Last Pap: 10/17/23 Result: negative HPV: negative     Last Completed Pap Smear            PAP SMEAR (Every 3 Years) Next due on 10/17/2026      10/17/2023  SCANNED - PAP SMEAR              "     History of abnormal Pap smear: no    Feelings of Anxiety or Depression:  controlled, although she hasn't had her medication in a week and is starting to feel a difference  Tobacco Usage?: Yes Vicky Melendrez  reports that she has been smoking cigarettes. She started smoking about 9 years ago. She has been smoking an average of .5 packs per day. She has been exposed to tobacco smoke. She has never used smokeless tobacco.. I have educated her on the risk of diseases from using tobacco products such as cancer, COPD, heart disease, reproductive problems, low birth weight, and cataracts.     I advised her to quit and she is willing to quit. We have discussed the following method/s for tobacco cessation:  Counseling.  Together we have set a quit date for 2 weeks from today.  She will follow up with me in 3 week or sooner to check on her progress.    I spent 3  minutes counseling the patient.        Alcohol/Drug Use?: not currently; in a sober living facility ([facility] in [place])  Over the age of 35 at delivery: no  Desires Genetic Screening: did not complete  Flu Status: Already given in current flu season    PMH    Current Outpatient Medications:     Acetaminophen Extra Strength 500 MG tablet, TAKE 1-2 TABLET(S) BY MOUTH EVERY 4-6 HOURS IF NEEDED; DO NOT EXCEED TWO TABLETS IN 4 HOURS OR EIGHT TABLETS IN 24 HOURS., Disp: , Rfl:     albuterol sulfate  (90 Base) MCG/ACT inhaler, Every 6 (Six) Hours., Disp: , Rfl:     bacitracin 500 UNIT/GM ointment, apply ONE application to skin TWICE DAILY (every 12 hours) AS NEEDED, Disp: , Rfl:     Banophen 25 MG capsule, Take 1 capsule by mouth every night at bedtime., Disp: 30 capsule, Rfl: 3    buprenorphine (SUBUTEX) 8 MG sublingual tablet SL tablet, PLACE TWO TABLETS UNDER THE TONGUE ONE TIME PER DAY AND ALLOW TO DISSOLVE SLOWLY IN MOUTH WITHOUT CHEWING OR SWALLOWING, Disp: , Rfl:     busPIRone (BUSPAR) 10 MG tablet, Take 1 tablet by mouth 3 (Three) Times  a Day., Disp: 90 tablet, Rfl: 3    docusate sodium (COLACE) 100 MG capsule, Take 2 capsules by mouth 2 (Two) Times a Day., Disp: 90 capsule, Rfl: 3    famotidine (PEPCID) 20 MG tablet, Take 1 tablet by mouth Daily., Disp: , Rfl:     ferrous sulfate 325 (65 FE) MG tablet, ferrous sulfate 325 mg (65 mg iron) tablet, Disp: , Rfl:     fluticasone (FLONASE) 50 MCG/ACT nasal spray, administer ONE SPRAY in each nostril TWICE DAILY, Disp: , Rfl:     GNP Sleep Aid 25 MG tablet, Take 1 tablet by mouth every night at bedtime., Disp: 30 tablet, Rfl: 3    guaiFENesin (MUCINEX) 600 MG 12 hr tablet, TAKE 1 TABLET BY MOUTH EVERY TWELVE HOURS AS NEEDED, Disp: , Rfl:     guaifenesin-dextromethorphan 600-30 mg (MUCINEX DM)  MG tablet sustained-release 12 hour, TAKE ONE TABLET BY MOUTH TWICE DAILY (every 12 hours) AS NEEDED, Disp: , Rfl:     hydrOXYzine pamoate (VISTARIL) 25 MG capsule, Take 2 capsules by mouth 3 (Three) Times a Day As Needed for Itching for up to 90 days., Disp: 90 capsule, Rfl: 4    naloxone (NARCAN) 4 MG/0.1ML nasal spray, spray 0.1 millilter (4 mg) in 1 nostril; may repeat dose every 2-3 minutes as needed by alternating nostrils with each dose, Disp: , Rfl:     nicotine (NICODERM CQ) 21 MG/24HR patch, , Disp: , Rfl:     omeprazole (priLOSEC) 20 MG capsule, omeprazole 20 mg capsule,delayed release, Disp: , Rfl:     ondansetron ODT (ZOFRAN-ODT) 4 MG disintegrating tablet, Place 1 tablet on the tongue Every 8 (Eight) Hours As Needed for Nausea or Vomiting., Disp: 30 tablet, Rfl: 4    Prenatal 28-0.8 MG tablet, Prenatal 28 mg iron-800 mcg tablet, Disp: , Rfl:     promethazine (PHENERGAN) 25 MG tablet, Take 1 tablet by mouth 6 (Six) Times a Day., Disp: , Rfl:     sertraline (ZOLOFT) 50 MG tablet, Take 1 tablet by mouth Daily., Disp: 60 tablet, Rfl: 3     Past Medical History:   Diagnosis Date    Anemia     Anxiety     Asthma     Bipolar disorder     Depression     Genital herpes     GERD (gastroesophageal reflux  disease)     History of substance abuse     Migraine     Trauma         Past Surgical History:   Procedure Laterality Date    NO PAST SURGERIES         Review of Systems   Review of Systems  Patient Reports: daily nausea (phenergan and zofran helps), daily heartburn (pepcid helps), +1 BLE edema (gets worse throughout the day), intermittent headaches (with blurry vision and spots; present for 1 month. Extra Strength Tylenol helps), difficulty with urination (present 2.5 weeks), constipation (stool softener helps, but hemorrhoids present), fatigue, and intermittent [place] (began last week)  Patient Denies: Spotting and Heavy bleeding  All systems reviewed and otherwise normal.    I have reviewed and agree with the HPI, ROS, and historical information as entered above. Sandeep Pierce MD      /56   Wt 103 kg (227 lb 3.2 oz)   BMI 42.93 kg/m²     The additional following portions of the patient's history were reviewed and updated as appropriate: allergies, current medications, past family history, past medical history, past social history, past surgical history, and problem list.    Physical Exam    Prenatal Vitals  BP: 108/56  Weight: 103 kg (227 lb 3.2 oz)     Physical Exam  Vitals and nursing note reviewed. Exam conducted with a chaperone present.   Constitutional:       Appearance: She is well-developed.   HENT:      Head: Normocephalic and atraumatic.   Neck:      Thyroid: No thyroid mass or thyromegaly.   Cardiovascular:      Rate and Rhythm: Normal rate and regular rhythm.      Heart sounds: No murmur heard.  Pulmonary:      Effort: Pulmonary effort is normal. No retractions.      Breath sounds: Normal breath sounds. No wheezing, rhonchi or rales.   Chest:      Chest wall: No mass or tenderness.   Breasts:     Right: Normal. No mass, nipple discharge, skin change or tenderness.      Left: Normal. No mass, nipple discharge, skin change or tenderness.   Abdominal:      General: Bowel sounds are  normal.      Palpations: Abdomen is soft. Abdomen is not rigid. There is no mass.      Tenderness: There is no abdominal tenderness. There is no guarding.      Hernia: No hernia is present. There is no hernia in the left inguinal area.          Comments: 33 weeks size pregnancy    Genitourinary:     Labia:         Right: No rash, tenderness or lesion.         Left: No rash, tenderness or lesion.       Vagina: Normal. No vaginal discharge or lesions.      Cervix: No cervical motion tenderness, discharge, lesion or cervical bleeding.      Uterus: Normal. Not enlarged, not fixed and not tender.       Adnexa:         Right: No mass or tenderness.          Left: No mass or tenderness.        Rectum: No external hemorrhoid.   Musculoskeletal:      Cervical back: Normal range of motion. No muscular tenderness.   Neurological:      Mental Status: She is alert and oriented to person, place, and time.   Psychiatric:         Behavior: Behavior normal.                    Assessment and Plan    Problem List Items Addressed This Visit    None  Visit Diagnoses       Prenatal care, third trimester    -  Primary    Relevant Medications    busPIRone (BUSPAR) 10 MG tablet    Banophen 25 MG capsule    docusate sodium (COLACE) 100 MG capsule    GNP Sleep Aid 25 MG tablet    hydrOXYzine pamoate (VISTARIL) 25 MG capsule    ondansetron ODT (ZOFRAN-ODT) 4 MG disintegrating tablet    sertraline (ZOLOFT) 50 MG tablet    Other Relevant Orders    Tdap Vaccine Greater Than or Equal To 8yo IM (Completed)    33 weeks gestation of pregnancy        Relevant Orders    Tdap Vaccine Greater Than or Equal To 8yo IM (Completed)    Need for Tdap vaccination        Relevant Orders    Tdap Vaccine Greater Than or Equal To 8yo IM (Completed)            Pregnancy at 33w2d  Reviewed routine prenatal care with the office and educational materials given  Lab(s) Ordered  Discussed options for genetic testing including first trimester nuchal translucency screen,  genetic disease carrier testing, quadruple screen, and NIPT  Patient is on Prenatal vitamins  Return in about 2 weeks (around 3/14/2024).      Sandeep Pierce MD  02/29/2024

## 2024-02-29 NOTE — TELEPHONE ENCOUNTER
Called [facility] and informed them that we had most records from her care there, but we were lacking the glucose screen results. Our office fax number given, and we should expect the official results soon.

## 2024-03-15 ENCOUNTER — TELEPHONE (OUTPATIENT)
Dept: OBSTETRICS AND GYNECOLOGY | Facility: CLINIC | Age: 29
End: 2024-03-15
Payer: MEDICAID

## 2024-03-15 NOTE — TELEPHONE ENCOUNTER
35w3d  MBT:O+  Pt states she had some swelling at her last appt in her legs and feet but it has gotten worse. She reports her calves and feet are hard and tingling and barely has feeling in them. She reports it is taking 6 seconds to return to normal after pushing on her foot. She reports using compression stockings and elevating legs when possible. She reports she has her BP taken twice daily and it has been WNL. I let her know I will speak with APRN and call her back. She VU

## 2024-03-15 NOTE — TELEPHONE ENCOUNTER
Provider: DR PICKARD    Caller: BERT EPPS    Phone Number: [phone]    Reason for Call: PATIENT IS CALLING IN TODAY WITH CONCERNS OVER HER LEGS AND FEET SWELLING//PATIENT STATED THAT THEY HAVE BEEN SWOLLEN FOR THE PAST COUPLE OF WEEK BUT OVER THE PAST COUPLE DAYS THEY HAVE BECAME WORSE AND TODAY SHE CAN BARELY FEEL HER FEET//PLEASE FOLLOW UP

## 2024-03-19 ENCOUNTER — ROUTINE PRENATAL (OUTPATIENT)
Dept: OBSTETRICS AND GYNECOLOGY | Facility: CLINIC | Age: 29
End: 2024-03-19
Payer: MEDICAID

## 2024-03-19 ENCOUNTER — LAB (OUTPATIENT)
Dept: LAB | Facility: HOSPITAL | Age: 29
End: 2024-03-19
Payer: MEDICAID

## 2024-03-19 VITALS — WEIGHT: 234 LBS | DIASTOLIC BLOOD PRESSURE: 68 MMHG | SYSTOLIC BLOOD PRESSURE: 126 MMHG | BODY MASS INDEX: 44.21 KG/M2

## 2024-03-19 DIAGNOSIS — Z34.83 PRENATAL CARE, SUBSEQUENT PREGNANCY, THIRD TRIMESTER: Primary | ICD-10-CM

## 2024-03-19 DIAGNOSIS — Z34.83 PRENATAL CARE, SUBSEQUENT PREGNANCY, THIRD TRIMESTER: ICD-10-CM

## 2024-03-19 PROCEDURE — 99213 OFFICE O/P EST LOW 20 MIN: CPT | Performed by: OBSTETRICS & GYNECOLOGY

## 2024-03-19 PROCEDURE — 87081 CULTURE SCREEN ONLY: CPT

## 2024-03-20 ENCOUNTER — TELEPHONE (OUTPATIENT)
Dept: OBSTETRICS AND GYNECOLOGY | Facility: CLINIC | Age: 29
End: 2024-03-20
Payer: MEDICAID

## 2024-03-20 DIAGNOSIS — F32.A ANXIETY AND DEPRESSION: Primary | ICD-10-CM

## 2024-03-20 DIAGNOSIS — F41.9 ANXIETY AND DEPRESSION: Primary | ICD-10-CM

## 2024-03-20 DIAGNOSIS — Z34.93 PRENATAL CARE, THIRD TRIMESTER: ICD-10-CM

## 2024-03-20 NOTE — TELEPHONE ENCOUNTER
sertraline (ZOLOFT) 50 MG tablet   albuterol sulfate  (90 Base) MCG/ACT inhaler   The patein is needing a refill on these medications.

## 2024-03-22 LAB — BACTERIA SPEC AEROBE CULT: NORMAL

## 2024-03-28 ENCOUNTER — ROUTINE PRENATAL (OUTPATIENT)
Dept: OBSTETRICS AND GYNECOLOGY | Facility: CLINIC | Age: 29
End: 2024-03-28
Payer: MEDICAID

## 2024-03-28 VITALS — SYSTOLIC BLOOD PRESSURE: 120 MMHG | BODY MASS INDEX: 43.46 KG/M2 | WEIGHT: 230 LBS | DIASTOLIC BLOOD PRESSURE: 68 MMHG

## 2024-03-28 DIAGNOSIS — Z34.93 PRENATAL CARE IN THIRD TRIMESTER: Primary | ICD-10-CM

## 2024-03-28 LAB
GLUCOSE UR STRIP-MCNC: NEGATIVE MG/DL
PROT UR STRIP-MCNC: NEGATIVE MG/DL

## 2024-03-28 NOTE — PROGRESS NOTES
OB FOLLOW UP  CC- Here for care of pregnancy        Vicky Melendrez is a 28 y.o.  37w2d patient being seen today for her obstetrical follow up visit. Patient reports moderate swelling with tingling from knees down, BH contractions.     Her prenatal care is complicated by (and status) :   There is no problem list on file for this patient.      GBS Status:   Group B Strep Culture   Date Value Ref Range Status   2024 No Group B Streptococcus isolated  Final         Allergies   Allergen Reactions    Citalopram Anaphylaxis, Hives and Rash          Flu Status: Already given in current flu season  Her Delivery Plan is: Undecided    US today: no  Non Stress Test: No.    ROS -   Patient Denies: Loss of Fluid, Vaginal Spotting, Vision Changes, Headaches, Nausea , Vomiting , Epigastric pain, and skin itching  Fetal Movement : normal  All other systems reviewed and are negative.       The additional following portions of the patient's history were reviewed and updated as appropriate: allergies and current medications.    I have reviewed and agree with the HPI, ROS, and historical information as entered above. Sandeep Pierce MD        EXAM: cx /-2    Prenatal Vitals  BP: 120/68  Weight: 104 kg (230 lb)                  Urine Glucose Read-only: Negative  Urine Protein Read-only: Negative           Assessment and Plan    Problem List Items Addressed This Visit    None  Visit Diagnoses       Prenatal care in third trimester    -  Primary    Relevant Orders    POC Urinalysis Dipstick (Completed)            Pregnancy at 37w2d  Fetal status reassuring.   Reviewed Pre-eclampsia signs/symptoms  Discussed options for IOL. Patient desires spontaneous labor. Would like to postpone an IOL unless medically indicated.   Delivery options reviewed with patient  Signs of labor reviewed  Kick counts reviewed  Activity and Exercise discussed.  Return in about 1 week (around 2024) for Recheck.    Sandeep Pierce  MD  03/28/2024

## 2024-04-04 ENCOUNTER — ROUTINE PRENATAL (OUTPATIENT)
Dept: OBSTETRICS AND GYNECOLOGY | Facility: CLINIC | Age: 29
End: 2024-04-04
Payer: MEDICAID

## 2024-04-04 VITALS — BODY MASS INDEX: 43.27 KG/M2 | DIASTOLIC BLOOD PRESSURE: 80 MMHG | WEIGHT: 229 LBS | SYSTOLIC BLOOD PRESSURE: 128 MMHG

## 2024-04-04 DIAGNOSIS — Z34.83 PRENATAL CARE, SUBSEQUENT PREGNANCY, THIRD TRIMESTER: Primary | ICD-10-CM

## 2024-04-04 LAB
GLUCOSE UR STRIP-MCNC: NEGATIVE MG/DL
PROT UR STRIP-MCNC: NEGATIVE MG/DL

## 2024-04-04 RX ORDER — VALACYCLOVIR HYDROCHLORIDE 500 MG/1
500 TABLET, FILM COATED ORAL DAILY
Qty: 30 TABLET | Refills: 2 | Status: SHIPPED | OUTPATIENT
Start: 2024-04-04 | End: 2024-05-04

## 2024-04-04 RX ORDER — SWAB
1 SWAB, NON-MEDICATED MISCELLANEOUS DAILY
Qty: 100 EACH | Refills: 2 | Status: SHIPPED | OUTPATIENT
Start: 2024-04-04

## 2024-04-04 RX ORDER — ALBUTEROL SULFATE 90 UG/1
2 AEROSOL, METERED RESPIRATORY (INHALATION)
Qty: 8 G | Refills: 2 | Status: SHIPPED | OUTPATIENT
Start: 2024-04-04

## 2024-04-04 NOTE — PROGRESS NOTES
OB FOLLOW UP  CC- Here for care of pregnancy        Vicky Melendrez is a 28 y.o.  38w2d patient being seen today for her obstetrical follow up visit. Patient reports swelling in bilateral feet/ankles. Patient with hx of HSV-needs suppresion meds. She is also asking for RF on PNV.      Her prenatal care is complicated by (and status) :  There is no problem list on file for this patient.      GBS Status: negative.     Allergies   Allergen Reactions    Citalopram Anaphylaxis, Hives and Rash          Her Delivery Plan is: Undecided    Non Stress Test: No.    ROS -   Patient Denies: Loss of Fluid, Vaginal Spotting, and Contractions  Fetal Movement : normal  All other systems reviewed and are negative.       The additional following portions of the patient's history were reviewed and updated as appropriate: allergies and current medications.    I have reviewed and agree with the HPI, ROS, and historical information as entered above. Sandeep Pierce MD        EXAM:     Prenatal Vitals  BP: 128/80  Weight: 104 kg (229 lb)                  Urine Glucose Read-only: Negative  Urine Protein Read-only: Negative           Assessment and Plan    Problem List Items Addressed This Visit    None  Visit Diagnoses       Prenatal care, subsequent pregnancy, third trimester    -  Primary    Relevant Orders    POC Urinalysis Dipstick (Completed)          Valtrex started   Pregnancy at 38w2d  Fetal status reassuring.   Medication(s) Ordered  Reviewed Pre-eclampsia signs/symptoms  Delivery options reviewed with patient  Signs of labor reviewed  Kick counts reviewed  Activity and Exercise discussed.  Return in about 1 week (around 2024).    Sandeep Pierce MD  2024

## 2024-04-10 ENCOUNTER — TELEPHONE (OUTPATIENT)
Dept: OBSTETRICS AND GYNECOLOGY | Facility: CLINIC | Age: 29
End: 2024-04-10
Payer: MEDICAID

## 2024-04-10 NOTE — LETTER
April 10, 2024     Patient: Vicky Melendrez   YOB: 1995   Date of Visit: 4/10/2024       To Whom It May Concern:    Vicky Melendrez has edema in both lower extremities due to advanced pregnancy. It would be beneficial if she could be allowed to elevate her feet while sitting in class. Please feel free to call my office if you have any questions.            Sincerely,        Sandeep Pierce MD    CC: No Recipients

## 2024-04-10 NOTE — TELEPHONE ENCOUNTER
Returned patient's call. Patient of Dr. Pierce;  @ 39w 1d. Next prenatal visit is tomorrow.   States she has been having BLE edema. Denies all other s/s of preeclampsia or other problems.   Reports normal fetal movement.   Asking for a letter requesting that the Sober Living facility allow her to elevate her feet while sitting in class. Informed her I will send letter in [facility] and she can discuss with Dr. Pierce tomorrow. Adviser her to call if she develops any s/s of preeclampsia or notes decreased fetal movement. She v/u and agreed. Letter sent.

## 2024-04-10 NOTE — TELEPHONE ENCOUNTER
Pt calling because of swelling in legs, ankles,and feet. Sober living program requires a note from a doctor in order to allow pt to elevate legs during class.

## 2024-04-11 ENCOUNTER — ROUTINE PRENATAL (OUTPATIENT)
Dept: OBSTETRICS AND GYNECOLOGY | Facility: CLINIC | Age: 29
End: 2024-04-11
Payer: MEDICAID

## 2024-04-11 VITALS — DIASTOLIC BLOOD PRESSURE: 88 MMHG | WEIGHT: 228.4 LBS | BODY MASS INDEX: 43.16 KG/M2 | SYSTOLIC BLOOD PRESSURE: 142 MMHG

## 2024-04-11 DIAGNOSIS — Z3A.39 39 WEEKS GESTATION OF PREGNANCY: ICD-10-CM

## 2024-04-11 DIAGNOSIS — Z34.93 PRENATAL CARE, THIRD TRIMESTER: Primary | ICD-10-CM

## 2024-04-11 LAB
EXPIRATION DATE: 0
GLUCOSE UR STRIP-MCNC: NEGATIVE MG/DL
Lab: 0
PROT UR STRIP-MCNC: NEGATIVE MG/DL

## 2024-04-11 NOTE — PROGRESS NOTES
OB FOLLOW UP  CC- Here for care of pregnancy        Vicky Melendrez is a 28 y.o.  39w2d patient being seen today for her obstetrical follow up visit. Patient reports she has caught a cold; reporting she had hot/cold sweats and nausea/vomiting last night. Emeses x4 last night; denies emeses today. Patient was unable to take her temperature, as the sober living facility does not have a thermometer. +1 BLE edema and daily acid reflux noted as well. Famotidine reported to help with reflux. She is also reporting she had to use her inhaler more often recently, with increased shortness of air.    In office oral temperature 98.0 degrees Farenheit.     Patient reports she needs proof of pregnancy for food stamps and a release letter to return to class.     Her prenatal care is complicated by (and status) :   There is no problem list on file for this patient.      GBS Status:   Group B Strep Culture   Date Value Ref Range Status   2024 No Group B Streptococcus isolated  Final         Allergies   Allergen Reactions    Citalopram Anaphylaxis, Hives and Rash          Flu Status: Already given in current flu season  Her Delivery Plan is: Does not desire IOL    US today: no  Non Stress Test: No.    ROS -   Patient Denies: Loss of Fluid, Vaginal Spotting, Vision Changes, Headaches, Contractions, and skin itching  Fetal Movement: normal  All other systems reviewed and are negative.       The additional following portions of the patient's history were reviewed and updated as appropriate: allergies, current medications, past family history, past medical history, past social history, past surgical history, and problem list.    I have reviewed and agree with the HPI, ROS, and historical information as entered above. Sandeep Pierce MD        EXAM:     Prenatal Vitals  BP: 142/88 (repeat 122/66 (while on NST))  Weight: 104 kg (228 lb 6.4 oz)       Non Stress Test: minutes 20  non-stress test: NST:  Reactive  indication: Hypertension  category: Category I             Urine Glucose Read-only: Negative  Urine Protein Read-only: Negative           Assessment and Plan    Problem List Items Addressed This Visit    None  Visit Diagnoses       Prenatal care, third trimester    -  Primary    Relevant Orders    POC Glucose, Urine, Qualitative, Dipstick (Completed)    POC Protein, Urine, Qualitative, Dipstick (Completed)    39 weeks gestation of pregnancy        Relevant Orders    POC Glucose, Urine, Qualitative, Dipstick (Completed)    POC Protein, Urine, Qualitative, Dipstick (Completed)        Refuses IOL and we will see her 1x  per week     Pregnancy at 39w2d  Fetal status reassuring.   Reviewed Pre-eclampsia signs/symptoms  Discussed options for IOL. Patient desires spontaneous labor. Would like to postpone an IOL unless medically indicated.   Delivery options reviewed with patient  Signs of labor reviewed  Kick counts reviewed  Activity and Exercise discussed.  Return in about 1 week (around 4/18/2024) for Recheck CALI Pierce MD  04/11/2024

## 2024-04-18 ENCOUNTER — ROUTINE PRENATAL (OUTPATIENT)
Dept: OBSTETRICS AND GYNECOLOGY | Facility: CLINIC | Age: 29
End: 2024-04-18
Payer: MEDICAID

## 2024-04-18 ENCOUNTER — ANESTHESIA (OUTPATIENT)
Dept: LABOR AND DELIVERY | Facility: HOSPITAL | Age: 29
End: 2024-04-18
Payer: MEDICAID

## 2024-04-18 ENCOUNTER — ANESTHESIA EVENT (OUTPATIENT)
Dept: LABOR AND DELIVERY | Facility: HOSPITAL | Age: 29
End: 2024-04-18
Payer: MEDICAID

## 2024-04-18 ENCOUNTER — HOSPITAL ENCOUNTER (INPATIENT)
Facility: HOSPITAL | Age: 29
LOS: 6 days | Discharge: HOME | End: 2024-04-24
Attending: OBSTETRICS & GYNECOLOGY | Admitting: OBSTETRICS & GYNECOLOGY
Payer: MEDICAID

## 2024-04-18 VITALS — DIASTOLIC BLOOD PRESSURE: 98 MMHG | SYSTOLIC BLOOD PRESSURE: 140 MMHG

## 2024-04-18 DIAGNOSIS — Z98.891 STATUS POST CESAREAN SECTION: Primary | ICD-10-CM

## 2024-04-18 DIAGNOSIS — Z34.93 PRENATAL CARE IN THIRD TRIMESTER: Primary | ICD-10-CM

## 2024-04-18 PROBLEM — Z34.90 CURRENTLY PREGNANT: Status: ACTIVE | Noted: 2024-04-18

## 2024-04-18 LAB
ABO GROUP BLD: NORMAL
ABO GROUP BLD: NORMAL
AMPHET+METHAMPHET UR QL: NEGATIVE
AMPHETAMINES UR QL: NEGATIVE
BARBITURATES UR QL SCN: NEGATIVE
BENZODIAZ UR QL SCN: NEGATIVE
BLD GP AB SCN SERPL QL: NEGATIVE
BUPRENORPHINE SERPL-MCNC: POSITIVE NG/ML
CANNABINOIDS SERPL QL: NEGATIVE
COCAINE UR QL: NEGATIVE
DEPRECATED RDW RBC AUTO: 40.6 FL (ref 37–54)
ERYTHROCYTE [DISTWIDTH] IN BLOOD BY AUTOMATED COUNT: 12.7 % (ref 12.3–15.4)
FENTANYL UR-MCNC: NEGATIVE NG/ML
GLUCOSE UR STRIP-MCNC: NEGATIVE MG/DL
HCT VFR BLD AUTO: 33.3 % (ref 34–46.6)
HGB BLD-MCNC: 11.2 G/DL (ref 12–15.9)
MCH RBC QN AUTO: 29.7 PG (ref 26.6–33)
MCHC RBC AUTO-ENTMCNC: 33.6 G/DL (ref 31.5–35.7)
MCV RBC AUTO: 88.3 FL (ref 79–97)
METHADONE UR QL SCN: NEGATIVE
OPIATES UR QL: NEGATIVE
OXYCODONE UR QL SCN: NEGATIVE
PCP UR QL SCN: NEGATIVE
PLATELET # BLD AUTO: 225 10*3/MM3 (ref 140–450)
PMV BLD AUTO: 9.9 FL (ref 6–12)
PROT UR STRIP-MCNC: NEGATIVE MG/DL
RBC # BLD AUTO: 3.77 10*6/MM3 (ref 3.77–5.28)
RH BLD: POSITIVE
RH BLD: POSITIVE
T PALLIDUM IGG SER QL: NORMAL
T&S EXPIRATION DATE: NORMAL
TRICYCLICS UR QL SCN: NEGATIVE
WBC NRBC COR # BLD AUTO: 10.3 10*3/MM3 (ref 3.4–10.8)

## 2024-04-18 PROCEDURE — 25010000002 ONDANSETRON PER 1 MG: Performed by: ANESTHESIOLOGY

## 2024-04-18 PROCEDURE — 25010000002 FENTANYL CITRATE (PF) 50 MCG/ML SOLUTION: Performed by: ANESTHESIOLOGY

## 2024-04-18 PROCEDURE — 80307 DRUG TEST PRSMV CHEM ANLYZR: CPT | Performed by: OBSTETRICS & GYNECOLOGY

## 2024-04-18 PROCEDURE — 25010000002 METOCLOPRAMIDE PER 10 MG: Performed by: ANESTHESIOLOGY

## 2024-04-18 PROCEDURE — 86850 RBC ANTIBODY SCREEN: CPT | Performed by: OBSTETRICS & GYNECOLOGY

## 2024-04-18 PROCEDURE — 25010000002 CEFAZOLIN PER 500 MG: Performed by: OBSTETRICS & GYNECOLOGY

## 2024-04-18 PROCEDURE — 25810000003 LACTATED RINGERS PER 1000 ML: Performed by: OBSTETRICS & GYNECOLOGY

## 2024-04-18 PROCEDURE — 86900 BLOOD TYPING SEROLOGIC ABO: CPT | Performed by: OBSTETRICS & GYNECOLOGY

## 2024-04-18 PROCEDURE — 86901 BLOOD TYPING SEROLOGIC RH(D): CPT

## 2024-04-18 PROCEDURE — 86900 BLOOD TYPING SEROLOGIC ABO: CPT

## 2024-04-18 PROCEDURE — S0260 H&P FOR SURGERY: HCPCS | Performed by: OBSTETRICS & GYNECOLOGY

## 2024-04-18 PROCEDURE — 86780 TREPONEMA PALLIDUM: CPT | Performed by: OBSTETRICS & GYNECOLOGY

## 2024-04-18 PROCEDURE — G0480 DRUG TEST DEF 1-7 CLASSES: HCPCS | Performed by: OBSTETRICS & GYNECOLOGY

## 2024-04-18 PROCEDURE — 86901 BLOOD TYPING SEROLOGIC RH(D): CPT | Performed by: OBSTETRICS & GYNECOLOGY

## 2024-04-18 PROCEDURE — 25010000002 ROPIVACAINE PER 1 MG: Performed by: ANESTHESIOLOGY

## 2024-04-18 PROCEDURE — C1755 CATHETER, INTRASPINAL: HCPCS | Performed by: ANESTHESIOLOGY

## 2024-04-18 PROCEDURE — 85027 COMPLETE CBC AUTOMATED: CPT | Performed by: OBSTETRICS & GYNECOLOGY

## 2024-04-18 PROCEDURE — 10907ZC DRAINAGE OF AMNIOTIC FLUID, THERAPEUTIC FROM PRODUCTS OF CONCEPTION, VIA NATURAL OR ARTIFICIAL OPENING: ICD-10-PCS | Performed by: OBSTETRICS & GYNECOLOGY

## 2024-04-18 PROCEDURE — 59025 FETAL NON-STRESS TEST: CPT

## 2024-04-18 RX ORDER — OXYTOCIN/0.9 % SODIUM CHLORIDE 30/500 ML
250 PLASTIC BAG, INJECTION (ML) INTRAVENOUS CONTINUOUS
Status: CANCELLED | OUTPATIENT
Start: 2024-04-18 | End: 2024-04-18

## 2024-04-18 RX ORDER — SODIUM CHLORIDE 0.9 % (FLUSH) 0.9 %
10 SYRINGE (ML) INJECTION EVERY 12 HOURS SCHEDULED
Status: DISCONTINUED | OUTPATIENT
Start: 2024-04-18 | End: 2024-04-19 | Stop reason: HOSPADM

## 2024-04-18 RX ORDER — CARBOPROST TROMETHAMINE 250 UG/ML
250 INJECTION, SOLUTION INTRAMUSCULAR
Status: CANCELLED | OUTPATIENT
Start: 2024-04-18

## 2024-04-18 RX ORDER — SODIUM CHLORIDE 0.9 % (FLUSH) 0.9 %
10 SYRINGE (ML) INJECTION EVERY 12 HOURS SCHEDULED
Status: DISCONTINUED | OUTPATIENT
Start: 2024-04-19 | End: 2024-04-19 | Stop reason: HOSPADM

## 2024-04-18 RX ORDER — DIPHENHYDRAMINE HYDROCHLORIDE 50 MG/ML
12.5 INJECTION INTRAMUSCULAR; INTRAVENOUS EVERY 8 HOURS PRN
Status: DISCONTINUED | OUTPATIENT
Start: 2024-04-18 | End: 2024-04-19 | Stop reason: HOSPADM

## 2024-04-18 RX ORDER — SODIUM CHLORIDE 0.9 % (FLUSH) 0.9 %
10 SYRINGE (ML) INJECTION AS NEEDED
Status: DISCONTINUED | OUTPATIENT
Start: 2024-04-18 | End: 2024-04-19 | Stop reason: HOSPADM

## 2024-04-18 RX ORDER — METHYLERGONOVINE MALEATE 0.2 MG/ML
200 INJECTION INTRAVENOUS ONCE AS NEEDED
Status: CANCELLED | OUTPATIENT
Start: 2024-04-18

## 2024-04-18 RX ORDER — MAGNESIUM CARB/ALUMINUM HYDROX 105-160MG
30 TABLET,CHEWABLE ORAL ONCE
Status: DISCONTINUED | OUTPATIENT
Start: 2024-04-18 | End: 2024-04-19 | Stop reason: HOSPADM

## 2024-04-18 RX ORDER — SODIUM CHLORIDE, SODIUM LACTATE, POTASSIUM CHLORIDE, CALCIUM CHLORIDE 600; 310; 30; 20 MG/100ML; MG/100ML; MG/100ML; MG/100ML
125 INJECTION, SOLUTION INTRAVENOUS CONTINUOUS
Status: DISCONTINUED | OUTPATIENT
Start: 2024-04-19 | End: 2024-04-19

## 2024-04-18 RX ORDER — METOCLOPRAMIDE HYDROCHLORIDE 5 MG/ML
10 INJECTION INTRAMUSCULAR; INTRAVENOUS ONCE AS NEEDED
Status: DISCONTINUED | OUTPATIENT
Start: 2024-04-18 | End: 2024-04-19 | Stop reason: HOSPADM

## 2024-04-18 RX ORDER — LIDOCAINE HYDROCHLORIDE AND EPINEPHRINE 15; 5 MG/ML; UG/ML
INJECTION, SOLUTION EPIDURAL AS NEEDED
Status: DISCONTINUED | OUTPATIENT
Start: 2024-04-18 | End: 2024-04-19 | Stop reason: SURG

## 2024-04-18 RX ORDER — OXYTOCIN/0.9 % SODIUM CHLORIDE 30/500 ML
999 PLASTIC BAG, INJECTION (ML) INTRAVENOUS ONCE
Status: CANCELLED | OUTPATIENT
Start: 2024-04-18 | End: 2024-04-18

## 2024-04-18 RX ORDER — EPHEDRINE SULFATE 5 MG/ML
INJECTION INTRAVENOUS
Status: DISCONTINUED
Start: 2024-04-18 | End: 2024-04-24 | Stop reason: HOSPADM

## 2024-04-18 RX ORDER — SODIUM CHLORIDE, SODIUM LACTATE, POTASSIUM CHLORIDE, CALCIUM CHLORIDE 600; 310; 30; 20 MG/100ML; MG/100ML; MG/100ML; MG/100ML
125 INJECTION, SOLUTION INTRAVENOUS CONTINUOUS
Status: DISCONTINUED | OUTPATIENT
Start: 2024-04-18 | End: 2024-04-19

## 2024-04-18 RX ORDER — MISOPROSTOL 200 UG/1
800 TABLET ORAL ONCE AS NEEDED
Status: CANCELLED | OUTPATIENT
Start: 2024-04-18

## 2024-04-18 RX ORDER — ACETAMINOPHEN 500 MG
1000 TABLET ORAL ONCE
Status: DISCONTINUED | OUTPATIENT
Start: 2024-04-19 | End: 2024-04-18 | Stop reason: SDUPTHER

## 2024-04-18 RX ORDER — FENTANYL CITRATE 50 UG/ML
INJECTION, SOLUTION INTRAMUSCULAR; INTRAVENOUS
Status: COMPLETED
Start: 2024-04-18 | End: 2024-04-18

## 2024-04-18 RX ORDER — PROMETHAZINE HYDROCHLORIDE 12.5 MG/1
12.5 SUPPOSITORY RECTAL EVERY 6 HOURS PRN
Status: CANCELLED | OUTPATIENT
Start: 2024-04-18

## 2024-04-18 RX ORDER — ACETAMINOPHEN 325 MG/1
650 TABLET ORAL EVERY 4 HOURS PRN
Status: CANCELLED | OUTPATIENT
Start: 2024-04-18

## 2024-04-18 RX ORDER — MORPHINE SULFATE 2 MG/ML
2 INJECTION, SOLUTION INTRAMUSCULAR; INTRAVENOUS
Status: CANCELLED | OUTPATIENT
Start: 2024-04-18 | End: 2024-04-28

## 2024-04-18 RX ORDER — OXYCODONE AND ACETAMINOPHEN 7.5; 325 MG/1; MG/1
2 TABLET ORAL EVERY 4 HOURS PRN
Status: CANCELLED | OUTPATIENT
Start: 2024-04-18 | End: 2024-04-28

## 2024-04-18 RX ORDER — CARBOPROST TROMETHAMINE 250 UG/ML
250 INJECTION, SOLUTION INTRAMUSCULAR AS NEEDED
Status: CANCELLED | OUTPATIENT
Start: 2024-04-18

## 2024-04-18 RX ORDER — ROPIVACAINE HYDROCHLORIDE 2 MG/ML
15 INJECTION, SOLUTION EPIDURAL; INFILTRATION; PERINEURAL CONTINUOUS
Status: DISCONTINUED | OUTPATIENT
Start: 2024-04-18 | End: 2024-04-19

## 2024-04-18 RX ORDER — SODIUM CHLORIDE 9 MG/ML
40 INJECTION, SOLUTION INTRAVENOUS AS NEEDED
Status: DISCONTINUED | OUTPATIENT
Start: 2024-04-18 | End: 2024-04-19 | Stop reason: HOSPADM

## 2024-04-18 RX ORDER — LIDOCAINE HYDROCHLORIDE 10 MG/ML
0.5 INJECTION, SOLUTION EPIDURAL; INFILTRATION; INTRACAUDAL; PERINEURAL ONCE AS NEEDED
Status: DISCONTINUED | OUTPATIENT
Start: 2024-04-18 | End: 2024-04-19 | Stop reason: HOSPADM

## 2024-04-18 RX ORDER — ACETAMINOPHEN 500 MG
1000 TABLET ORAL ONCE
Status: COMPLETED | OUTPATIENT
Start: 2024-04-19 | End: 2024-04-19

## 2024-04-18 RX ORDER — PROMETHAZINE HYDROCHLORIDE 12.5 MG/1
12.5 TABLET ORAL EVERY 6 HOURS PRN
Status: CANCELLED | OUTPATIENT
Start: 2024-04-18

## 2024-04-18 RX ORDER — MISOPROSTOL 200 UG/1
800 TABLET ORAL AS NEEDED
Status: CANCELLED | OUTPATIENT
Start: 2024-04-18

## 2024-04-18 RX ORDER — SODIUM CHLORIDE 9 MG/ML
INJECTION, SOLUTION INTRAVENOUS
Status: DISCONTINUED
Start: 2024-04-18 | End: 2024-04-24 | Stop reason: HOSPADM

## 2024-04-18 RX ORDER — LIDOCAINE HCL/EPINEPHRINE/PF 2%-1:200K
VIAL (ML) INJECTION AS NEEDED
Status: DISCONTINUED | OUTPATIENT
Start: 2024-04-18 | End: 2024-04-19 | Stop reason: SURG

## 2024-04-18 RX ORDER — OXYTOCIN/0.9 % SODIUM CHLORIDE 30/500 ML
2-20 PLASTIC BAG, INJECTION (ML) INTRAVENOUS
Status: DISCONTINUED | OUTPATIENT
Start: 2024-04-18 | End: 2024-04-19 | Stop reason: HOSPADM

## 2024-04-18 RX ORDER — ACETAMINOPHEN 325 MG/1
650 TABLET ORAL EVERY 4 HOURS PRN
Status: DISCONTINUED | OUTPATIENT
Start: 2024-04-18 | End: 2024-04-19 | Stop reason: HOSPADM

## 2024-04-18 RX ORDER — FAMOTIDINE 10 MG/ML
20 INJECTION, SOLUTION INTRAVENOUS ONCE AS NEEDED
Status: DISCONTINUED | OUTPATIENT
Start: 2024-04-18 | End: 2024-04-19 | Stop reason: HOSPADM

## 2024-04-18 RX ORDER — EPHEDRINE SULFATE 5 MG/ML
10 INJECTION INTRAVENOUS
Status: DISCONTINUED | OUTPATIENT
Start: 2024-04-18 | End: 2024-04-19 | Stop reason: HOSPADM

## 2024-04-18 RX ORDER — FENTANYL CITRATE 50 UG/ML
INJECTION, SOLUTION INTRAMUSCULAR; INTRAVENOUS AS NEEDED
Status: DISCONTINUED | OUTPATIENT
Start: 2024-04-18 | End: 2024-04-19 | Stop reason: SURG

## 2024-04-18 RX ORDER — CITRIC ACID/SODIUM CITRATE 334-500MG
30 SOLUTION, ORAL ORAL ONCE
Status: COMPLETED | OUTPATIENT
Start: 2024-04-18 | End: 2024-04-18

## 2024-04-18 RX ORDER — CITRIC ACID/SODIUM CITRATE 334-500MG
30 SOLUTION, ORAL ORAL ONCE
Status: DISCONTINUED | OUTPATIENT
Start: 2024-04-19 | End: 2024-04-19 | Stop reason: HOSPADM

## 2024-04-18 RX ORDER — ONDANSETRON 2 MG/ML
4 INJECTION INTRAMUSCULAR; INTRAVENOUS ONCE AS NEEDED
Status: DISCONTINUED | OUTPATIENT
Start: 2024-04-18 | End: 2024-04-19 | Stop reason: HOSPADM

## 2024-04-18 RX ORDER — ROPIVACAINE HYDROCHLORIDE 5 MG/ML
INJECTION, SOLUTION EPIDURAL; INFILTRATION; PERINEURAL AS NEEDED
Status: DISCONTINUED | OUTPATIENT
Start: 2024-04-18 | End: 2024-04-19 | Stop reason: SURG

## 2024-04-18 RX ADMIN — METOCLOPRAMIDE 10 MG: 5 INJECTION, SOLUTION INTRAMUSCULAR; INTRAVENOUS at 23:58

## 2024-04-18 RX ADMIN — ONDANSETRON 4 MG: 2 INJECTION INTRAMUSCULAR; INTRAVENOUS at 23:58

## 2024-04-18 RX ADMIN — EPHEDRINE SULFATE 10 MG: 5 INJECTION INTRAVENOUS at 20:46

## 2024-04-18 RX ADMIN — SODIUM CHLORIDE, POTASSIUM CHLORIDE, SODIUM LACTATE AND CALCIUM CHLORIDE: 600; 310; 30; 20 INJECTION, SOLUTION INTRAVENOUS at 23:55

## 2024-04-18 RX ADMIN — EPHEDRINE SULFATE 10 MG: 5 INJECTION INTRAVENOUS at 23:27

## 2024-04-18 RX ADMIN — MIDAZOLAM 1 MG: 1 INJECTION INTRAMUSCULAR; INTRAVENOUS at 23:58

## 2024-04-18 RX ADMIN — ROPIVACAINE HYDROCHLORIDE 7 ML: 5 INJECTION, SOLUTION EPIDURAL; INFILTRATION; PERINEURAL at 20:30

## 2024-04-18 RX ADMIN — LIDOCAINE HYDROCHLORIDE AND EPINEPHRINE 2 ML: 15; 5 INJECTION, SOLUTION EPIDURAL at 20:26

## 2024-04-18 RX ADMIN — Medication 2 MILLI-UNITS/MIN: at 15:42

## 2024-04-18 RX ADMIN — FENTANYL CITRATE 100 MCG: 50 INJECTION, SOLUTION INTRAMUSCULAR; INTRAVENOUS at 20:30

## 2024-04-18 RX ADMIN — LIDOCAINE HYDROCHLORIDE AND EPINEPHRINE 3 ML: 15; 5 INJECTION, SOLUTION EPIDURAL at 20:23

## 2024-04-18 RX ADMIN — SODIUM CHLORIDE, POTASSIUM CHLORIDE, SODIUM LACTATE AND CALCIUM CHLORIDE 125 ML/HR: 600; 310; 30; 20 INJECTION, SOLUTION INTRAVENOUS at 15:32

## 2024-04-18 RX ADMIN — LIDOCAINE HYDROCHLORIDE AND EPINEPHRINE 10 ML: 20; 5 INJECTION, SOLUTION EPIDURAL; INFILTRATION; INTRACAUDAL; PERINEURAL at 23:44

## 2024-04-18 RX ADMIN — SODIUM CITRATE AND CITRIC ACID MONOHYDRATE 30 ML: 500; 334 SOLUTION ORAL at 23:51

## 2024-04-18 RX ADMIN — SODIUM CHLORIDE 2000 MG: 900 INJECTION INTRAVENOUS at 23:53

## 2024-04-18 RX ADMIN — ROPIVACAINE HYDROCHLORIDE 15 ML/HR: 2 INJECTION, SOLUTION EPIDURAL; INFILTRATION at 20:33

## 2024-04-18 RX ADMIN — FAMOTIDINE 20 MG: 10 INJECTION, SOLUTION INTRAVENOUS at 23:58

## 2024-04-18 NOTE — ANESTHESIA PREPROCEDURE EVALUATION
Anesthesia Evaluation     Patient summary reviewed and Nursing notes reviewed                Airway   Mallampati: II  TM distance: >3 FB  Neck ROM: full  Possible difficult intubation  Dental    (+) poor dentition        Pulmonary    (+) a smoker Current, cigarettes, asthma,  Cardiovascular - negative cardio ROS        Neuro/Psych  (+) headaches, psychiatric history Anxiety, Depression and Bipolar  GI/Hepatic/Renal/Endo    (+) obesity, morbid obesity, GERD    Musculoskeletal (-) negative ROS    Abdominal    Substance History   (+) drug use (on subutex)     OB/GYN    (+) Pregnant        Other - negative ROS                   Anesthesia Plan    ASA 3     epidural       Anesthetic plan, risks, benefits, and alternatives have been provided, discussed and informed consent has been obtained with: patient.  Pre-procedure education provided    CODE STATUS:    Level Of Support Discussed With: Patient  Code Status (Patient has no pulse and is not breathing): CPR (Attempt to Resuscitate)  Medical Interventions (Patient has pulse or is breathing): Full Support

## 2024-04-18 NOTE — PROGRESS NOTES
OB FOLLOW UP  CC- Here for care of pregnancy        Vicky Melendrez is a 28 y.o.  40w2d patient being seen today for her obstetrical follow up visit. Patient reports no complaints.     Her prenatal care is complicated by (and status) :   Patient Active Problem List   Diagnosis    Currently pregnant       GBS Status:   Group B Strep Culture   Date Value Ref Range Status   2024 No Group B Streptococcus isolated  Final         Allergies   Allergen Reactions    Citalopram Anaphylaxis, Hives and Rash          Her Delivery Plan is: Undecided    US today: no  Non Stress Test: No.    ROS -   Patient Denies: Loss of Fluid, Vaginal Spotting, Vision Changes, Headaches, Contractions, Epigastric pain, and skin itching  Fetal Movement : normal  All other systems reviewed and are negative.       The additional following portions of the patient's history were reviewed and updated as appropriate: allergies, current medications, past family history, past medical history, past social history, past surgical history, and problem list.    I have reviewed and agree with the HPI, ROS, and historical information as entered above. Sandeep Pierce MD        EXAM:     Prenatal Vitals  BP: 140/98  Weight:  (unable to obtain weight)                  Urine Glucose Read-only: Negative  Urine Protein Read-only: Negative           Assessment and Plan    Problem List Items Addressed This Visit    None  Visit Diagnoses       Prenatal care in third trimester    -  Primary    Relevant Orders    POC Urinalysis Dipstick (Completed)            Pregnancy at 40w2d  Fetal status reassuring.   Reviewed Pre-eclampsia signs/symptoms  BP up and we will send her to be induced  Delivery options reviewed with patient  Signs of labor reviewed  Kick counts reviewed  Activity and Exercise discussed.  No follow-ups on file.    Sandeep Pierce MD  2024

## 2024-04-18 NOTE — H&P
VAISHNAVI Ward  Obstetric History and Physical    Chief Complaint   Patient presents with    Scheduled Induction       Subjective     Patient is a 28 y.o. female  currently at 40w2d, who presents with pregnancy at term.  Noted to have hypertension.  Originally patient had refused induction but now at term with this blood pressures she will be induced.  She also is acting somewhat erratically and appears not to be in control of her body movements.  Baby seems to be okay though.    Her prenatal care is .  Her previous obstetric/gynecological history is noted for is non-contributory.    The following portions of the patients history were reviewed and updated as appropriate: current medications .       Prenatal Information:  Prenatal Results       Initial Prenatal Labs       Test Value Reference Range Date Time    Hemoglobin ^ 13.5 g/dL  10/30/23     Hematocrit ^ 40 %  10/30/23     Platelets ^ 286 K/µL  10/30/23     Rubella IgG        Hepatitis B SAg ^ Negative   10/30/23     Hepatitis C Ab ^ Non-Reactive   23       ^ Non-Reactive   10/30/23     RPR ^ Non-Reactive   10/30/23     T. Pallidum Ab         ABO  O   21 1129    Rh  Positive   21 1129    Antibody Screen        HIV ^ Non-Reactive   10/30/23     Urine Culture        Gonorrhea  Not Detected  Not Detected  21 1130    Chlamydia  Not Detected  Not Detected  21 1130    TSH ^ 1.72 m[IU]/mL  23     HgB A1c         Varicella IgG        HgB Electrophoresis         Cystic fibrosis                   Fetal testing        Test Value Reference Range Date Time    NIPT        MSAFP        AFP-4                  2nd and 3rd Trimester       Test Value Reference Range Date Time    Hemoglobin (repeated) ^ 11.4 g/dL  24        10.5 g/dL 12.0 - 15.9 24 0908       10.2 g/dL 12.0 - 15.9 24 0948    Hematocrit (repeated) ^ 34 %  24        31.7 % 34.0 - 46.6 24 0908       31.1 % 34.0 - 46.6 24 0948    Platelets  ^  231   01/24/24        271 10*3/mm3 140 - 450 01/05/24 0908       236 10*3/mm3 140 - 450 01/03/24 0948      ^ 286 K/µL  10/30/23     GCT        Antibody Screen (repeated)        3rd TM syphilis scrn (repeated)  RPR         3rd TM syphilis scrn (repeated) FTA        GTT Fasting        GTT 1 Hr        GTT 2 Hr        GTT 3 Hr        Group B Strep  No Group B Streptococcus isolated   03/19/24 1834              Other testing        Test Value Reference Range Date Time    Parvo IgG         CMV IgG                   Drug Screening       Test Value Reference Range Date Time    Amphetamine Screen ^ Negative   12/27/23     Barbiturate Screen ^ Negative   12/27/23     Benzodiazepine Screen ^ Negative   12/27/23     Methadone Screen        Phencyclidine Screen        Opiates Screen ^ Negative  Negative 12/27/23     THC Screen ^ Negative  Negative 12/27/23     Cocaine Screen ^ Negative   12/27/23     Propoxyphene Screen        Buprenorphine Screen ^ Negative  Negative 12/27/23     Methamphetamine Screen        Oxycodone Screen ^ Negative  Negative 12/27/23     Tricyclic Antidepressants Screen                  Legend    ^: Historical                          External Prenatal Results       Pregnancy Outside Results - Transcribed From Office Records - See Scanned Records For Details       Test Value Date Time    ABO  O  09/25/21 1129    Rh  Positive  09/25/21 1129    Antibody Screen       Varicella IgG       Rubella       Hgb ^ 11.4 g/dL 01/24/24        10.5 g/dL 01/05/24 0908       10.2 g/dL 01/03/24 0948      ^ 13.5 g/dL 10/30/23     Hct ^ 34 % 01/24/24        31.7 % 01/05/24 0908       31.1 % 01/03/24 0948      ^ 40 % 10/30/23     Glucose Fasting GTT       Glucose Tolerance Test 1 hour       Glucose Tolerance Test 3 hour       Gonorrhea (discrete)  Not Detected  09/25/21 1130    Chlamydia (discrete)  Not Detected  09/25/21 1130    RPR ^ Non-Reactive  10/30/23     VDRL       Syphilis Antibody       HBsAg ^ Negative  10/30/23      Herpes Simplex Virus PCR       Herpes Simplex VIrus Culture       HIV ^ Non-Reactive  10/30/23     Hep C RNA Quant PCR       Hep C Antibody ^ Non-Reactive  23       ^ Non-Reactive  10/30/23     AFP       Group B Strep  No Group B Streptococcus isolated  24 1834    GBS Susceptibility to Clindamycin       GBS Susceptibility to Erythromycin       Fetal Fibronectin       Genetic Testing, Maternal Blood                 Drug Screening       Test Value Date Time    Urine Drug Screen       Amphetamine Screen ^ Negative  23     Barbiturate Screen ^ Negative  23     Benzodiazepine Screen ^ Negative  23     Methadone Screen       Phencyclidine Screen       Opiates Screen ^ Negative  23     THC Screen ^ Negative  23     Cocaine Screen ^ Negative  23     Propoxyphene Screen       Buprenorphine Screen ^ Negative  23     Methamphetamine Screen       Oxycodone Screen ^ Negative  23     Tricyclic Antidepressants Screen                 Legend    ^: Historical                             Past OB History:     OB History    Para Term  AB Living   6 5 4 1 0 5   SAB IAB Ectopic Molar Multiple Live Births   0 0 0 0 0 5      # Outcome Date GA Lbr Braden/2nd Weight Sex Type Anes PTL Lv   6 Current            5 Term 22 39w0d  2722 g (6 lb) M Vag-Spont   MALA   4  21 32w0d  2353 g (5 lb 3 oz) M Vag-Spont   MALA      Complications:  labor   3 Term 19 38w0d  2608 g (5 lb 12 oz) F Vag-Spont   MALA   2 Term 17 38w0d  3175 g (7 lb) M Vag-Spont   MALA   1 Term 16 38w0d  3076 g (6 lb 12.5 oz) F Vag-Spont   MALA      Complications:  delivered by vacuum extraction       Past Medical History: Past Medical History:   Diagnosis Date    Anemia     Anxiety     Asthma     Bipolar disorder     Depression     Genital herpes     GERD (gastroesophageal reflux disease)     History of substance abuse     Migraine     Trauma       Past Surgical  "History Past Surgical History:   Procedure Laterality Date    NO PAST SURGERIES        Family History: Family History   Problem Relation Age of Onset    Stroke Father     Hypertension Mother     Other Mother         Guillian barre    Diabetes type II Sister     Parkinsonism Maternal Grandmother     Leukemia Maternal Grandfather       Social History:  reports that she has been smoking cigarettes. She started smoking about 9 years ago. She has a 4.6 pack-year smoking history. She has been exposed to tobacco smoke. She has never used smokeless tobacco.   reports that she does not currently use alcohol.   reports that she does not currently use drugs after having used the following drugs: Barbiturates, Benzodiazepines, \"Crack\" cocaine, Fentanyl, Heroin, Hydrocodone, Methamphetamines, and Solvent inhalants.        General ROS: Pertinent items are noted in HPI    Objective       Vital Signs Range for the last 24 hours  Temperature:     Temp Source:     BP: BP: (140)/(98) 140/98   Pulse:     Respirations:     SPO2:     O2 Amount (l/min):     O2 Devices     Weight: Weight:  [107 kg (235 lb)] 107 kg (235 lb)     Physical Examination: General appearance - alert, well appearing, and in no distress    Presentation:    Cervix: Exam by:     Dilation:     Effacement:     Station:         Fetal Heart Rate Assessment   Method:     Beats/min:     Baseline:     Varibility:     Accels:     Decels:     Tracing Category:       Uterine Assessment   Method:     Frequency (min):     Ctx Count in 10 min:     Duration:     Intensity:     Intensity by IUPC:     Resting Tone:     Resting Tone by IUPC:     Bondurant Units:       Laboratory Results:   Radiology Review:   Other Studies:     Assessment & Plan       * No active hospital problems. *        Assessment:  1.  Intrauterine pregnancy at 40w2d weeks gestation with reactive fetal status.    2.  Use of Subutex.  Pregnancy-induced hypertension.  3.  Obstetrical history significant for is " "non-contributory.  4.  GBS status: No results found for: \"GBSANTIGEN\"    Plan:  1.  Induce with Pitocin.  2. Plan of care has been reviewed with patient and family  3.  Risks, benefits of treatment plan have been discussed.  4.  All questions have been answered.  5.        Sandeep Pierce MD  4/18/2024  14:31 EDT  "

## 2024-04-19 LAB
ATMOSPHERIC PRESS: ABNORMAL MM[HG]
ATMOSPHERIC PRESS: ABNORMAL MM[HG]
BASE EXCESS BLDCOA CALC-SCNC: -2.5 MMOL/L (ref 0–2)
BASE EXCESS BLDCOV CALC-SCNC: -2.1 MMOL/L (ref 0–2)
BDY SITE: ABNORMAL
BDY SITE: ABNORMAL
BODY TEMPERATURE: 37
BODY TEMPERATURE: 37
CO2 BLDA-SCNC: 27 MMOL/L (ref 22–33)
CO2 BLDA-SCNC: 29.9 MMOL/L (ref 22–33)
EPAP: 0
EPAP: 0
HCO3 BLDCOA-SCNC: 27.7 MMOL/L (ref 16.9–20.5)
HCO3 BLDCOV-SCNC: 25.4 MMOL/L (ref 18.6–21.4)
HGB BLDA-MCNC: 16.4 G/DL (ref 14–18)
HGB BLDA-MCNC: 16.5 G/DL (ref 14–18)
INHALED O2 CONCENTRATION: 21 %
INHALED O2 CONCENTRATION: 21 %
IPAP: 0
IPAP: 0
MODALITY: ABNORMAL
MODALITY: ABNORMAL
NOTE: 0
PAW @ PEAK INSP FLOW SETTING VENT: 0 CMH2O
PAW @ PEAK INSP FLOW SETTING VENT: 0 CMH2O
PCO2 BLDCOA: 70.6 MMHG (ref 43.3–54.9)
PCO2 BLDCOV: 52.5 MM HG (ref 28–40)
PH BLDCOA: 7.2 PH UNITS (ref 7.22–7.3)
PH BLDCOV: 7.29 PH UNITS (ref 7.31–7.37)
PO2 BLDCOA: 10.2 MMHG (ref 11.5–43.3)
PO2 BLDCOV: 24.7 MM HG (ref 21–31)
SAO2 % BLDCOA: 12.3 %
SAO2 % BLDCOA: ABNORMAL %
SAO2 % BLDCOV: 52.5 %
TOTAL RATE: 0 BREATHS/MINUTE
TOTAL RATE: 0 BREATHS/MINUTE
VENTILATOR MODE: ABNORMAL

## 2024-04-19 PROCEDURE — 25810000003 LACTATED RINGERS PER 1000 ML: Performed by: OBSTETRICS & GYNECOLOGY

## 2024-04-19 PROCEDURE — 25810000003 SODIUM CHLORIDE 0.9 % SOLUTION 250 ML FLEX CONT: Performed by: OBSTETRICS & GYNECOLOGY

## 2024-04-19 PROCEDURE — 25010000002 MIDAZOLAM PER 1 MG: Performed by: ANESTHESIOLOGY

## 2024-04-19 PROCEDURE — 25010000002 AZITHROMYCIN PER 500 MG: Performed by: OBSTETRICS & GYNECOLOGY

## 2024-04-19 PROCEDURE — 82805 BLOOD GASES W/O2 SATURATION: CPT

## 2024-04-19 PROCEDURE — 25010000002 CHLOROPROCAINE HCL (PF) 3 % SOLUTION: Performed by: ANESTHESIOLOGY

## 2024-04-19 PROCEDURE — 59515 CESAREAN DELIVERY: CPT | Performed by: OBSTETRICS & GYNECOLOGY

## 2024-04-19 PROCEDURE — 25010000002 MORPHINE PER 10 MG: Performed by: ANESTHESIOLOGY

## 2024-04-19 PROCEDURE — 25010000002 KETOROLAC TROMETHAMINE PER 15 MG: Performed by: OBSTETRICS & GYNECOLOGY

## 2024-04-19 RX ORDER — HYDROCORTISONE 25 MG/G
1 CREAM TOPICAL AS NEEDED
Status: DISCONTINUED | OUTPATIENT
Start: 2024-04-19 | End: 2024-04-24 | Stop reason: HOSPADM

## 2024-04-19 RX ORDER — CALCIUM CARBONATE 500 MG/1
1 TABLET, CHEWABLE ORAL EVERY 4 HOURS PRN
Status: DISCONTINUED | OUTPATIENT
Start: 2024-04-19 | End: 2024-04-24 | Stop reason: HOSPADM

## 2024-04-19 RX ORDER — ENOXAPARIN SODIUM 100 MG/ML
40 INJECTION SUBCUTANEOUS EVERY 12 HOURS
Status: DISCONTINUED | OUTPATIENT
Start: 2024-04-20 | End: 2024-04-24 | Stop reason: HOSPADM

## 2024-04-19 RX ORDER — DIPHENHYDRAMINE HCL 25 MG
25 CAPSULE ORAL EVERY 4 HOURS PRN
Status: CANCELLED | OUTPATIENT
Start: 2024-04-19

## 2024-04-19 RX ORDER — DIPHENHYDRAMINE HYDROCHLORIDE 50 MG/ML
25 INJECTION INTRAMUSCULAR; INTRAVENOUS ONCE AS NEEDED
Status: CANCELLED | OUTPATIENT
Start: 2024-04-19

## 2024-04-19 RX ORDER — ONDANSETRON 2 MG/ML
4 INJECTION INTRAMUSCULAR; INTRAVENOUS ONCE AS NEEDED
Status: DISCONTINUED | OUTPATIENT
Start: 2024-04-19 | End: 2024-04-19

## 2024-04-19 RX ORDER — ACETAMINOPHEN 500 MG
1000 TABLET ORAL EVERY 6 HOURS
Status: COMPLETED | OUTPATIENT
Start: 2024-04-19 | End: 2024-04-20

## 2024-04-19 RX ORDER — OXYTOCIN/0.9 % SODIUM CHLORIDE 30/500 ML
PLASTIC BAG, INJECTION (ML) INTRAVENOUS AS NEEDED
Status: DISCONTINUED | OUTPATIENT
Start: 2024-04-19 | End: 2024-04-19 | Stop reason: SURG

## 2024-04-19 RX ORDER — FAMOTIDINE 10 MG/ML
INJECTION, SOLUTION INTRAVENOUS AS NEEDED
Status: DISCONTINUED | OUTPATIENT
Start: 2024-04-18 | End: 2024-04-19 | Stop reason: SURG

## 2024-04-19 RX ORDER — OXYTOCIN/0.9 % SODIUM CHLORIDE 30/500 ML
85 PLASTIC BAG, INJECTION (ML) INTRAVENOUS ONCE
Status: DISCONTINUED | OUTPATIENT
Start: 2024-04-19 | End: 2024-04-19 | Stop reason: HOSPADM

## 2024-04-19 RX ORDER — OXYCODONE HYDROCHLORIDE 5 MG/1
5 TABLET ORAL EVERY 4 HOURS PRN
Status: DISCONTINUED | OUTPATIENT
Start: 2024-04-19 | End: 2024-04-24 | Stop reason: HOSPADM

## 2024-04-19 RX ORDER — CARBOPROST TROMETHAMINE 250 UG/ML
INJECTION, SOLUTION INTRAMUSCULAR AS NEEDED
Status: DISCONTINUED | OUTPATIENT
Start: 2024-04-19 | End: 2024-04-19 | Stop reason: SURG

## 2024-04-19 RX ORDER — PRENATAL VIT/IRON FUM/FOLIC AC 27MG-0.8MG
1 TABLET ORAL DAILY
Status: DISCONTINUED | OUTPATIENT
Start: 2024-04-19 | End: 2024-04-24 | Stop reason: HOSPADM

## 2024-04-19 RX ORDER — CHLOROPROCAINE HYDROCHLORIDE 30 MG/ML
INJECTION, SOLUTION EPIDURAL; INFILTRATION; INTRACAUDAL; PERINEURAL AS NEEDED
Status: DISCONTINUED | OUTPATIENT
Start: 2024-04-19 | End: 2024-04-19 | Stop reason: SURG

## 2024-04-19 RX ORDER — DIPHENHYDRAMINE HYDROCHLORIDE 50 MG/ML
25 INJECTION INTRAMUSCULAR; INTRAVENOUS EVERY 4 HOURS PRN
Status: CANCELLED | OUTPATIENT
Start: 2024-04-19

## 2024-04-19 RX ORDER — OXYTOCIN/0.9 % SODIUM CHLORIDE 30/500 ML
650 PLASTIC BAG, INJECTION (ML) INTRAVENOUS ONCE
Status: COMPLETED | OUTPATIENT
Start: 2024-04-19 | End: 2024-04-19

## 2024-04-19 RX ORDER — OXYCODONE HYDROCHLORIDE 10 MG/1
10 TABLET ORAL EVERY 4 HOURS PRN
Status: DISCONTINUED | OUTPATIENT
Start: 2024-04-19 | End: 2024-04-24 | Stop reason: HOSPADM

## 2024-04-19 RX ORDER — MIDAZOLAM HYDROCHLORIDE 1 MG/ML
INJECTION INTRAMUSCULAR; INTRAVENOUS AS NEEDED
Status: DISCONTINUED | OUTPATIENT
Start: 2024-04-18 | End: 2024-04-19 | Stop reason: SURG

## 2024-04-19 RX ORDER — DOCUSATE SODIUM 100 MG/1
100 CAPSULE, LIQUID FILLED ORAL 2 TIMES DAILY PRN
Status: DISCONTINUED | OUTPATIENT
Start: 2024-04-19 | End: 2024-04-24 | Stop reason: HOSPADM

## 2024-04-19 RX ORDER — CARBOPROST TROMETHAMINE 250 UG/ML
INJECTION, SOLUTION INTRAMUSCULAR
Status: COMPLETED
Start: 2024-04-19 | End: 2024-04-19

## 2024-04-19 RX ORDER — METHYLERGONOVINE MALEATE 0.2 MG/ML
200 INJECTION INTRAVENOUS ONCE AS NEEDED
Status: DISCONTINUED | OUTPATIENT
Start: 2024-04-19 | End: 2024-04-24 | Stop reason: HOSPADM

## 2024-04-19 RX ORDER — OXYTOCIN/0.9 % SODIUM CHLORIDE 30/500 ML
125 PLASTIC BAG, INJECTION (ML) INTRAVENOUS ONCE AS NEEDED
Status: DISCONTINUED | OUTPATIENT
Start: 2024-04-19 | End: 2024-04-24 | Stop reason: HOSPADM

## 2024-04-19 RX ORDER — PHENYLEPHRINE HCL IN 0.9% NACL 1 MG/10 ML
SYRINGE (ML) INTRAVENOUS AS NEEDED
Status: DISCONTINUED | OUTPATIENT
Start: 2024-04-19 | End: 2024-04-19 | Stop reason: SURG

## 2024-04-19 RX ORDER — METOCLOPRAMIDE HYDROCHLORIDE 5 MG/ML
INJECTION INTRAMUSCULAR; INTRAVENOUS AS NEEDED
Status: DISCONTINUED | OUTPATIENT
Start: 2024-04-18 | End: 2024-04-19 | Stop reason: SURG

## 2024-04-19 RX ORDER — CARBOPROST TROMETHAMINE 250 UG/ML
250 INJECTION, SOLUTION INTRAMUSCULAR AS NEEDED
Status: DISCONTINUED | OUTPATIENT
Start: 2024-04-19 | End: 2024-04-24 | Stop reason: HOSPADM

## 2024-04-19 RX ORDER — ACETAMINOPHEN 325 MG/1
650 TABLET ORAL EVERY 6 HOURS
Status: DISCONTINUED | OUTPATIENT
Start: 2024-04-20 | End: 2024-04-22

## 2024-04-19 RX ORDER — NALOXONE HCL 0.4 MG/ML
0.4 VIAL (ML) INJECTION
Status: DISCONTINUED | OUTPATIENT
Start: 2024-04-19 | End: 2024-04-19

## 2024-04-19 RX ORDER — ONDANSETRON 2 MG/ML
INJECTION INTRAMUSCULAR; INTRAVENOUS AS NEEDED
Status: DISCONTINUED | OUTPATIENT
Start: 2024-04-18 | End: 2024-04-19 | Stop reason: SURG

## 2024-04-19 RX ORDER — KETOROLAC TROMETHAMINE 30 MG/ML
30 INJECTION, SOLUTION INTRAMUSCULAR; INTRAVENOUS ONCE
Status: COMPLETED | OUTPATIENT
Start: 2024-04-19 | End: 2024-04-19

## 2024-04-19 RX ORDER — KETOROLAC TROMETHAMINE 15 MG/ML
15 INJECTION, SOLUTION INTRAMUSCULAR; INTRAVENOUS EVERY 6 HOURS
Status: COMPLETED | OUTPATIENT
Start: 2024-04-19 | End: 2024-04-20

## 2024-04-19 RX ORDER — SIMETHICONE 80 MG
80 TABLET,CHEWABLE ORAL 4 TIMES DAILY PRN
Status: DISCONTINUED | OUTPATIENT
Start: 2024-04-19 | End: 2024-04-24 | Stop reason: HOSPADM

## 2024-04-19 RX ORDER — ALUMINA, MAGNESIA, AND SIMETHICONE 2400; 2400; 240 MG/30ML; MG/30ML; MG/30ML
15 SUSPENSION ORAL EVERY 4 HOURS PRN
Status: DISCONTINUED | OUTPATIENT
Start: 2024-04-19 | End: 2024-04-24 | Stop reason: HOSPADM

## 2024-04-19 RX ORDER — MORPHINE SULFATE 0.5 MG/ML
INJECTION, SOLUTION EPIDURAL; INTRATHECAL; INTRAVENOUS AS NEEDED
Status: DISCONTINUED | OUTPATIENT
Start: 2024-04-19 | End: 2024-04-19 | Stop reason: SURG

## 2024-04-19 RX ORDER — IBUPROFEN 600 MG/1
600 TABLET ORAL EVERY 6 HOURS
Status: DISCONTINUED | OUTPATIENT
Start: 2024-04-20 | End: 2024-04-24 | Stop reason: HOSPADM

## 2024-04-19 RX ORDER — MISOPROSTOL 200 UG/1
600 TABLET ORAL AS NEEDED
Status: DISCONTINUED | OUTPATIENT
Start: 2024-04-19 | End: 2024-04-24 | Stop reason: HOSPADM

## 2024-04-19 RX ORDER — METOCLOPRAMIDE 10 MG/1
10 TABLET ORAL EVERY 6 HOURS PRN
Status: DISCONTINUED | OUTPATIENT
Start: 2024-04-19 | End: 2024-04-24 | Stop reason: HOSPADM

## 2024-04-19 RX ADMIN — ACETAMINOPHEN 1000 MG: 500 TABLET ORAL at 09:07

## 2024-04-19 RX ADMIN — OXYCODONE 5 MG: 5 TABLET ORAL at 03:56

## 2024-04-19 RX ADMIN — MORPHINE SULFATE 3 MG: 0.5 INJECTION, SOLUTION EPIDURAL; INTRATHECAL; INTRAVENOUS at 00:18

## 2024-04-19 RX ADMIN — PRENATAL VITAMINS-IRON FUMARATE 27 MG IRON-FOLIC ACID 0.8 MG TABLET 1 TABLET: at 09:07

## 2024-04-19 RX ADMIN — ACETAMINOPHEN 1000 MG: 500 TABLET ORAL at 02:23

## 2024-04-19 RX ADMIN — KETOROLAC TROMETHAMINE 30 MG: 30 INJECTION, SOLUTION INTRAMUSCULAR; INTRAVENOUS at 02:22

## 2024-04-19 RX ADMIN — BUPRENORPHINE 22 MG: 8 TABLET SUBLINGUAL at 09:05

## 2024-04-19 RX ADMIN — AZITHROMYCIN DIHYDRATE 500 MG: 500 INJECTION, POWDER, LYOPHILIZED, FOR SOLUTION INTRAVENOUS at 02:35

## 2024-04-19 RX ADMIN — Medication 650 ML/HR: at 02:36

## 2024-04-19 RX ADMIN — Medication 1000 ML: at 00:18

## 2024-04-19 RX ADMIN — MIDAZOLAM 1 MG: 1 INJECTION INTRAMUSCULAR; INTRAVENOUS at 00:20

## 2024-04-19 RX ADMIN — ACETAMINOPHEN 1000 MG: 500 TABLET ORAL at 14:58

## 2024-04-19 RX ADMIN — Medication 100 MCG: at 00:41

## 2024-04-19 RX ADMIN — ACETAMINOPHEN 1000 MG: 500 TABLET ORAL at 20:50

## 2024-04-19 RX ADMIN — CARBOPROST TROMETHAMINE 250 MCG: 250 INJECTION, SOLUTION INTRAMUSCULAR at 00:18

## 2024-04-19 RX ADMIN — KETOROLAC TROMETHAMINE 15 MG: 15 INJECTION, SOLUTION INTRAMUSCULAR; INTRAVENOUS at 11:57

## 2024-04-19 RX ADMIN — KETOROLAC TROMETHAMINE 15 MG: 15 INJECTION, SOLUTION INTRAMUSCULAR; INTRAVENOUS at 18:03

## 2024-04-19 RX ADMIN — SODIUM CHLORIDE, POTASSIUM CHLORIDE, SODIUM LACTATE AND CALCIUM CHLORIDE: 600; 310; 30; 20 INJECTION, SOLUTION INTRAVENOUS at 00:42

## 2024-04-19 RX ADMIN — CHLOROPROCAINE HYDROCHLORIDE 5 ML: 30 INJECTION, SOLUTION EPIDURAL; INFILTRATION; INTRACAUDAL; PERINEURAL at 00:05

## 2024-04-19 NOTE — PROGRESS NOTES
Patient now 4/90/-1.  Internal lead placed IUPC working status post epidural.  Having occasional De cells thought to be mostly from epidural effects.  Will stop Pitocin for now restart as needed and allow baby to recover well.

## 2024-04-19 NOTE — LACTATION NOTE
04/19/24 1305   Maternal Assessment   Breast Shape   (although everted, infant refuses to latch without shield as infant has been getting bottles. After placing a small shield on patient infant was able to latch & nurse well. Encouraged freq feeds at breast q2-3 hrs or on demand.)   Nipples Bilateral:;everted   Maternal Infant Feeding   Maternal Emotional State receptive;relaxed   Infant Positioning clutch/football  (left)   Signs of Milk Transfer deep jaw excursions noted   Pain with Feeding no   Latch Assistance full assistance needed  (mother took over after demonstration given.)

## 2024-04-19 NOTE — H&P (VIEW-ONLY)
Patient remains 4 cm and is having significant decelerations such that we have to turn the Pitocin off.  The fetal status appears compromised such that a delivery is indicated now.  Going to proceed with primary  we counseled the patient she understands the risks and wants to proceed.  We will give azithromycin and Ancef.

## 2024-04-19 NOTE — INTERVAL H&P NOTE
H&P updated. The patient was examined and cervix remains 4 cm but significant fetal compromise decelerations with decelerations noted and it is a recurrent issue and  will be performed.

## 2024-04-19 NOTE — ANESTHESIA POSTPROCEDURE EVALUATION
Patient: Vicky Melendrez    Procedure Summary       Date: 24 Room / Location: [facility] LABOR DELIVERY   [facility] LABOR DELIVERY    Anesthesia Start:  Anesthesia Stop: 24    Procedure:  SECTION PRIMARY (Abdomen) Diagnosis:     Surgeons: Sandeep Pierce MD Provider: Monet Jose DO    Anesthesia Type: epidural ASA Status: 3            Anesthesia Type: epidural    Vitals  Vitals Value Taken Time   /67 24 0540   Temp 95.8 °F (35.4 °C) 24 0640   Pulse 58 24 0540   Resp 16 24 0540   SpO2 96 % 24 0225   Vitals shown include unfiled device data.        Post Anesthesia Care and Evaluation    Patient location during evaluation: bedside  Patient participation: complete - patient participated  Level of consciousness: awake and alert  Pain management: adequate    Airway patency: patent  Anesthetic complications: No anesthetic complications    Cardiovascular status: acceptable  Respiratory status: acceptable  Hydration status: acceptable  Post Neuraxial Block status: Motor and sensory function returned to baseline and No signs or symptoms of PDPH

## 2024-04-19 NOTE — OP NOTE
"Operative Note    Patient name: Vicky Melendrez  YOB: 1995   MRN: [id number]  Admission Date: 2024  Referring Provider: Sandeep Pierce MD    ID: 28 y.o.  at 40w3d    Pre-Operative Dx:   Intrauterine pregnancy at 40w3d weeks   Non-reassuring fetal status      Postoperative dx:    Intrauterine pregnancy at 40w3d weeks   Non-reassuring fetal status            Procedure(s): primarylow transverse  delivery     Surgeons: Surgeons and Role:     * Sandeep Pierce MD - Primary    Staff:  Surgeon(s):  Sandeep Pierce MD           Assistant: Malu Nichole PCT    Anesthesia: Epidural    Estimated Blood Loss:  500  mL    IV Fluids: 1900 mls    Preoperative antibiotic: Ancef (cefazolin) 2 grams and azithromycin    Blood products:   Blood Administration Record (From admission, onward)      None            Pathology: * No orders in the log *    Drains: Beth catheter to gravity    Complications: None    Condition: Stable to recovery room    Infant:                Gender: female  infant    Weight: 2740 g (6 lb 0.7 oz)     Apgars: 8  @ 1 minute /     9  @ 5 minutes    Cord gases: Venous:  No results found for: \"PHCVEN\", \"BECVEN\"      Arterial:  No results found for: \"PHCART\", \"BECART\"          Operative Summary: Patient counseled about the risks of  including the possibility of bleeding infection damage to bowel bladder and ureter as well as postop issues like blood clots hematomas pneumonias.  They wanted to proceed with a delivery at this time.  We were going to do a tubal occlusion for sterilization but she did not sign her permanent in time.   After obtaining informed consent the patient was taken to the operating room where adequate anesthesia was obtained.  Beth catheter was placed in the bladder preoperatively.  IV antibiotics were given preoperatively.       The abdomen was prepped and draped in the usual sterile fashion for  delivery.  After confirming adequate " anesthesia a Pfannenstiel skin incision was made with the scalpel and carried through to the underlying layer of fascia.  The fascia was incised in the midline and the incision extended laterally with the Aguilar scissors and with blunt dissection.       The upper aspect of the fascia was grasped with 2 Kocher clamps, elevated, and dissected off the underlying rectus muscles bluntly and with the Aguilar scissors.  The Kocher clamps were removed and applied to the inferior aspect of the fascia.  The fascia was dissected off of the rectus muscles in the same fashion.  The peritoneum was entered bluntly.  The incision was stretched and the bladder blade and Car retractor inserted for visualization of the uterus. A bladder flap was made and bladder blade replaced.        The uterus was incised with the scalpel in a low transverse fashion.  The uterine incision was entered digitally and the incision extended bluntly in a craniocaudal fashion.  Retractors were removed and membranes were ruptured.  The infant was delivered atraumatically from vertex presentation.  The umbilical cord was milked 4 times, clamped and cut and the nose and mouth bulb suctioned.  The infant was handed off to waiting pediatric staff.       Cord blood gases were collected from a clamped segment of umbilical cord.  Cord blood was collected.  The placenta was removed using cord traction and uterine massage.  The uterus was exteriorized and cleared of all clots and debris.  The uterine incision was repaired with #1 chromic in a running locked fashion. A double layer technique was used.  Additional hemostatic measures required: electrocautery and figure-of-eight sutures.    The incision was inspected and excellent hemostasis was noted.  The tubes and ovaries were noted to be normal.   The uterus was returned to the abdomen.  The gutters were cleared of all clots and debris.  Irrigation was used.  The uterine incision was again inspected and found to  be hemostatic.       The peritoneum was reapproximated with 2-0 Vicryl in a running fashion.  The fascia was closed with 0 Vicryl in a running fashion.  The subcutaneous space was reapproximated using 3-0 plain gut in interrupted stiches.      The skin was closed using staples, and dressing placed. All sharp, instrument, and sponge counts were correct. The patient was transferred to the recovery room in stable condition.    Surgical Assist: Or crew assisted trinh Pierce MD  4/19/2024

## 2024-04-19 NOTE — ANESTHESIA POSTPROCEDURE EVALUATION
Patient: Vicky Melendrez    Procedure Summary       Date: 24 Room / Location: [facility] LABOR DELIVERY   [facility] LABOR DELIVERY    Anesthesia Start:  Anesthesia Stop:     Procedure:  SECTION PRIMARY (Abdomen) Diagnosis:     Surgeons: Sandeep Pierce MD Provider: Monet Jose DO    Anesthesia Type: epidural ASA Status: 3            Anesthesia Type: epidural    Vitals  Vitals Value Taken Time   /53 24 2349   Temp     Pulse 75 24 2349   Resp     SpO2 99 % 24 2256   Vitals shown include unfiled device data.    /58  RR 15  HR 56  SAT 96%    Post Anesthesia Care and Evaluation    Patient location during evaluation: bedside  Patient participation: complete - patient participated  Level of consciousness: awake and awake and alert  Pain score: 0  Pain management: satisfactory to patient    Airway patency: patent  Anesthetic complications: No anesthetic complications  PONV Status: none  Cardiovascular status: acceptable, hemodynamically stable and stable  Respiratory status: acceptable  Hydration status: stable  Post Neuraxial Block status: No signs or symptoms of PDPH

## 2024-04-19 NOTE — ANESTHESIA PROCEDURE NOTES
Labor Epidural      Patient reassessed immediately prior to procedure    Patient location during procedure: OB  Performed By  Anesthesiologist: Monet Jose DO  Preanesthetic Checklist  Completed: patient identified, IV checked, risks and benefits discussed, surgical consent, monitors and equipment checked, pre-op evaluation and timeout performed  Additional Notes  Difficult placement     CSE performed using 25g Fernando  Prep:  Pt Position:sitting  Sterile Tech:cap, gloves, mask and sterile barrier  Prep:chlorhexidine gluconate and isopropyl alcohol  Monitoring:blood pressure monitoring  Epidural Block Procedure:  Approach:midline  Guidance:palpation technique  Location:L3-L4  Needle Type:Tuohy  Needle Gauge:17 G  Loss of Resistance Medium: air  Loss of Resistance: 7cm  Cath Depth at skin:14 cm  Paresthesia: none  Aspiration:negative  Test Dose:negative  Number of Attempts: 3  Post Assessment:  Dressing:occlusive dressing applied and secured with tape  Pt Tolerance:patient tolerated the procedure well with no apparent complications  Complications:no

## 2024-04-19 NOTE — LACTATION NOTE
"   04/19/24 1056   Maternal Information   Date of Referral 04/19/24   Person Making Referral lactation consultant   Maternal Reason for Referral no prior breastfeeding experience  (Courtesy visit for new delivery. Hx: Pt states she didn't breastfeed any of her children & she didn't make a lot of milk. Mother also states she was on drugs. \"I would like to try & nurse this baby\")   Maternal Assessment   Breast Size Issue none   Breast Shape Bilateral:;round   Breast Density Bilateral:;soft  (very soft)   Nipples Bilateral:;everted   Left Nipple Symptoms intact;nontender   Right Nipple Symptoms intact;nontender   Maternal Infant Feeding   Maternal Emotional State receptive;relaxed   Latch Assistance   (offered to check latch with next feeding since mom recently gave 20mls of formula.)   Milk Expression/Equipment   Breast Pump Type double electric, personal  (pt has a Zomee pump for home use.)   Breast Pumping   Breast Pumping Interventions   (Encouraged pt to pump for short or missed feedings.)       "

## 2024-04-20 LAB
ALP SERPL-CCNC: 185 U/L (ref 39–117)
ALT SERPL W P-5'-P-CCNC: 19 U/L (ref 1–33)
AST SERPL-CCNC: 27 U/L (ref 1–32)
BASOPHILS # BLD AUTO: 0.03 10*3/MM3 (ref 0–0.2)
BASOPHILS NFR BLD AUTO: 0.3 % (ref 0–1.5)
BILIRUB SERPL-MCNC: <0.2 MG/DL (ref 0–1.2)
CREAT SERPL-MCNC: 0.43 MG/DL (ref 0.57–1)
DEPRECATED RDW RBC AUTO: 43.8 FL (ref 37–54)
EOSINOPHIL # BLD AUTO: 0.13 10*3/MM3 (ref 0–0.4)
EOSINOPHIL NFR BLD AUTO: 1.1 % (ref 0.3–6.2)
ERYTHROCYTE [DISTWIDTH] IN BLOOD BY AUTOMATED COUNT: 13 % (ref 12.3–15.4)
HCT VFR BLD AUTO: 32 % (ref 34–46.6)
HGB BLD-MCNC: 10.1 G/DL (ref 12–15.9)
IMM GRANULOCYTES # BLD AUTO: 0.05 10*3/MM3 (ref 0–0.05)
IMM GRANULOCYTES NFR BLD AUTO: 0.4 % (ref 0–0.5)
LDH SERPL-CCNC: 264 U/L (ref 135–214)
LYMPHOCYTES # BLD AUTO: 2.65 10*3/MM3 (ref 0.7–3.1)
LYMPHOCYTES NFR BLD AUTO: 22.8 % (ref 19.6–45.3)
MCH RBC QN AUTO: 29.3 PG (ref 26.6–33)
MCHC RBC AUTO-ENTMCNC: 31.6 G/DL (ref 31.5–35.7)
MCV RBC AUTO: 92.8 FL (ref 79–97)
MONOCYTES # BLD AUTO: 0.83 10*3/MM3 (ref 0.1–0.9)
MONOCYTES NFR BLD AUTO: 7.1 % (ref 5–12)
NEUTROPHILS NFR BLD AUTO: 68.3 % (ref 42.7–76)
NEUTROPHILS NFR BLD AUTO: 7.92 10*3/MM3 (ref 1.7–7)
NRBC BLD AUTO-RTO: 0 /100 WBC (ref 0–0.2)
PLATELET # BLD AUTO: 205 10*3/MM3 (ref 140–450)
PMV BLD AUTO: 10.5 FL (ref 6–12)
RBC # BLD AUTO: 3.45 10*6/MM3 (ref 3.77–5.28)
URATE SERPL-MCNC: 4.4 MG/DL (ref 2.4–5.7)
WBC NRBC COR # BLD AUTO: 11.61 10*3/MM3 (ref 3.4–10.8)

## 2024-04-20 PROCEDURE — 84460 ALANINE AMINO (ALT) (SGPT): CPT | Performed by: NURSE PRACTITIONER

## 2024-04-20 PROCEDURE — 83615 LACTATE (LD) (LDH) ENZYME: CPT | Performed by: NURSE PRACTITIONER

## 2024-04-20 PROCEDURE — 25010000002 ENOXAPARIN PER 10 MG: Performed by: OBSTETRICS & GYNECOLOGY

## 2024-04-20 PROCEDURE — 84075 ASSAY ALKALINE PHOSPHATASE: CPT | Performed by: NURSE PRACTITIONER

## 2024-04-20 PROCEDURE — 85025 COMPLETE CBC W/AUTO DIFF WBC: CPT | Performed by: OBSTETRICS & GYNECOLOGY

## 2024-04-20 PROCEDURE — 84450 TRANSFERASE (AST) (SGOT): CPT | Performed by: NURSE PRACTITIONER

## 2024-04-20 PROCEDURE — 84550 ASSAY OF BLOOD/URIC ACID: CPT | Performed by: NURSE PRACTITIONER

## 2024-04-20 PROCEDURE — 82565 ASSAY OF CREATININE: CPT | Performed by: NURSE PRACTITIONER

## 2024-04-20 PROCEDURE — 25010000002 KETOROLAC TROMETHAMINE PER 15 MG: Performed by: OBSTETRICS & GYNECOLOGY

## 2024-04-20 PROCEDURE — 82247 BILIRUBIN TOTAL: CPT | Performed by: NURSE PRACTITIONER

## 2024-04-20 RX ADMIN — PRENATAL VITAMINS-IRON FUMARATE 27 MG IRON-FOLIC ACID 0.8 MG TABLET 1 TABLET: at 09:59

## 2024-04-20 RX ADMIN — KETOROLAC TROMETHAMINE 15 MG: 15 INJECTION, SOLUTION INTRAMUSCULAR; INTRAVENOUS at 00:10

## 2024-04-20 RX ADMIN — ACETAMINOPHEN 650 MG: 325 TABLET ORAL at 15:47

## 2024-04-20 RX ADMIN — DOCUSATE SODIUM 100 MG: 100 CAPSULE, LIQUID FILLED ORAL at 20:03

## 2024-04-20 RX ADMIN — BUPRENORPHINE 22 MG: 8 TABLET SUBLINGUAL at 09:59

## 2024-04-20 RX ADMIN — OXYCODONE HYDROCHLORIDE 10 MG: 10 TABLET ORAL at 23:28

## 2024-04-20 RX ADMIN — ENOXAPARIN SODIUM 40 MG: 100 INJECTION SUBCUTANEOUS at 17:52

## 2024-04-20 RX ADMIN — ACETAMINOPHEN 1000 MG: 500 TABLET ORAL at 03:52

## 2024-04-20 RX ADMIN — ACETAMINOPHEN 650 MG: 325 TABLET ORAL at 20:04

## 2024-04-20 RX ADMIN — IBUPROFEN 600 MG: 600 TABLET, FILM COATED ORAL at 17:51

## 2024-04-20 RX ADMIN — ENOXAPARIN SODIUM 40 MG: 100 INJECTION SUBCUTANEOUS at 03:52

## 2024-04-20 RX ADMIN — SIMETHICONE 80 MG: 80 TABLET, CHEWABLE ORAL at 20:04

## 2024-04-20 RX ADMIN — KETOROLAC TROMETHAMINE 15 MG: 15 INJECTION, SOLUTION INTRAMUSCULAR; INTRAVENOUS at 05:49

## 2024-04-20 RX ADMIN — ACETAMINOPHEN 650 MG: 325 TABLET ORAL at 09:59

## 2024-04-20 RX ADMIN — OXYCODONE HYDROCHLORIDE 10 MG: 10 TABLET ORAL at 17:51

## 2024-04-20 RX ADMIN — IBUPROFEN 600 MG: 600 TABLET, FILM COATED ORAL at 23:28

## 2024-04-20 RX ADMIN — IBUPROFEN 600 MG: 600 TABLET, FILM COATED ORAL at 12:06

## 2024-04-20 RX ADMIN — OXYCODONE HYDROCHLORIDE 10 MG: 10 TABLET ORAL at 12:17

## 2024-04-20 RX ADMIN — DOCUSATE SODIUM 100 MG: 100 CAPSULE, LIQUID FILLED ORAL at 09:59

## 2024-04-20 NOTE — LACTATION NOTE
04/20/24 1130   Maternal Information   Date of Referral 04/20/24   Person Making Referral lactation consultant  (fu consult)   Maternal Reason for Referral no prior breastfeeding experience   Infant Reason for Referral   (mom has decided to bottle feed and pump)   Milk Expression/Equipment   Breast Pump Type double electric, personal;manual pump  (Mom has a Zoomie pump at home--will have family bring. Gave hand pump and demonstrated how to use it.)   Breast Pump Flange Type hard   Breast Pump Flange Size 24 mm   Breast Pumping   Breast Pumping Interventions post-feed pumping encouraged  (Encouraged to pump every 3 hours to get best milk supply possible.)     Reviewed teaching --documented under Education. Encouraged skin to skin. Mom has syringes--Discussed how to syringe feed small volumes of colostrum. Encouraged to call lactation services, if there are questions or concerns or wants help with pump when family brings it.

## 2024-04-20 NOTE — PROGRESS NOTES
2024    Name:Vicky Melendrez    MR#:[id number]     PROGRESS NOTE:  Post-Op 1 S/P    HD:2    Subjective   28 y.o. yo Female  s/p CS at 40w3d doing well. Pain well controlled. Tolerating regular diet and having flatus. Lochia normal.  She denies headache, visual changes.         Objective    Vitals  Temp:  Temp:  [96.4 °F (35.8 °C)-97.7 °F (36.5 °C)] 97.5 °F (36.4 °C)  Temp src: Oral  BP:  BP: (111-139)/(65-81) 122/73  Pulse:  Heart Rate:  [62-87] 73  RR:   Resp:  [16-18] 16    General Awake, alert, no distress  Abdomen Soft, non-distended, fundus firm, below umbilicus, appropriately tender  Incision  Intact, no erythema or exudate  Extremities Calves NT bilaterally     I/O last 3 completed shifts:  In: 1500 [I.V.:1500]  Out: 3575 [Urine:3575]    LABS:   Lab Results   Component Value Date    WBC 11.61 (H) 2024    HGB 10.1 (L) 2024    HCT 32.0 (L) 2024    MCV 92.8 2024     2024       Infant: female       Assessment   1.  POD 1, doing well   2.  Hemodynamically stable   3.  GHTN at term--- BP wnl now on no meds; asymptomatic   4.  MO-- Lovenox daily   5.  Hx substance abuse--- on Subutex    Plan:  Routine postoperative care.  Add PEP.  Advance.      Active Problems:   None      Goldie Lindsey, APRN  2024 09:05 EDT

## 2024-04-21 PROBLEM — Z34.90 CURRENTLY PREGNANT: Status: RESOLVED | Noted: 2024-04-18 | Resolved: 2024-04-21

## 2024-04-21 PROBLEM — Z98.891 STATUS POST CESAREAN SECTION: Status: ACTIVE | Noted: 2024-04-21

## 2024-04-21 LAB
ALP SERPL-CCNC: 187 U/L (ref 39–117)
ALT SERPL W P-5'-P-CCNC: 19 U/L (ref 1–33)
AST SERPL-CCNC: 24 U/L (ref 1–32)
BILIRUB SERPL-MCNC: 0.2 MG/DL (ref 0–1.2)
CREAT SERPL-MCNC: 0.53 MG/DL (ref 0.57–1)
DEPRECATED RDW RBC AUTO: 41.5 FL (ref 37–54)
ERYTHROCYTE [DISTWIDTH] IN BLOOD BY AUTOMATED COUNT: 12.9 % (ref 12.3–15.4)
HCT VFR BLD AUTO: 34.5 % (ref 34–46.6)
HGB BLD-MCNC: 11.3 G/DL (ref 12–15.9)
LDH SERPL-CCNC: 272 U/L (ref 135–214)
MCH RBC QN AUTO: 29 PG (ref 26.6–33)
MCHC RBC AUTO-ENTMCNC: 32.8 G/DL (ref 31.5–35.7)
MCV RBC AUTO: 88.5 FL (ref 79–97)
PLATELET # BLD AUTO: 239 10*3/MM3 (ref 140–450)
PMV BLD AUTO: 9.9 FL (ref 6–12)
RBC # BLD AUTO: 3.9 10*6/MM3 (ref 3.77–5.28)
URATE SERPL-MCNC: 4.8 MG/DL (ref 2.4–5.7)
WBC NRBC COR # BLD AUTO: 9.77 10*3/MM3 (ref 3.4–10.8)

## 2024-04-21 PROCEDURE — 82565 ASSAY OF CREATININE: CPT | Performed by: OBSTETRICS & GYNECOLOGY

## 2024-04-21 PROCEDURE — 25010000002 ENOXAPARIN PER 10 MG: Performed by: OBSTETRICS & GYNECOLOGY

## 2024-04-21 PROCEDURE — 84075 ASSAY ALKALINE PHOSPHATASE: CPT | Performed by: OBSTETRICS & GYNECOLOGY

## 2024-04-21 PROCEDURE — 25810000003 LACTATED RINGERS PER 1000 ML: Performed by: OBSTETRICS & GYNECOLOGY

## 2024-04-21 PROCEDURE — 84460 ALANINE AMINO (ALT) (SGPT): CPT | Performed by: OBSTETRICS & GYNECOLOGY

## 2024-04-21 PROCEDURE — 84450 TRANSFERASE (AST) (SGOT): CPT | Performed by: OBSTETRICS & GYNECOLOGY

## 2024-04-21 PROCEDURE — 85027 COMPLETE CBC AUTOMATED: CPT | Performed by: OBSTETRICS & GYNECOLOGY

## 2024-04-21 PROCEDURE — 25010000002 HYDROXYZINE PER 25 MG: Performed by: OBSTETRICS & GYNECOLOGY

## 2024-04-21 PROCEDURE — 84550 ASSAY OF BLOOD/URIC ACID: CPT | Performed by: OBSTETRICS & GYNECOLOGY

## 2024-04-21 PROCEDURE — 83615 LACTATE (LD) (LDH) ENZYME: CPT | Performed by: OBSTETRICS & GYNECOLOGY

## 2024-04-21 PROCEDURE — 82247 BILIRUBIN TOTAL: CPT | Performed by: OBSTETRICS & GYNECOLOGY

## 2024-04-21 PROCEDURE — 25010000002 MAGNESIUM SULFATE 20 GM/500ML SOLUTION: Performed by: OBSTETRICS & GYNECOLOGY

## 2024-04-21 RX ORDER — HYDROXYZINE HYDROCHLORIDE 50 MG/ML
50 INJECTION, SOLUTION INTRAMUSCULAR 3 TIMES DAILY
Status: DISCONTINUED | OUTPATIENT
Start: 2024-04-21 | End: 2024-04-21

## 2024-04-21 RX ORDER — MAGNESIUM SULFATE HEPTAHYDRATE 40 MG/ML
2 INJECTION, SOLUTION INTRAVENOUS CONTINUOUS
Status: DISCONTINUED | OUTPATIENT
Start: 2024-04-21 | End: 2024-04-24

## 2024-04-21 RX ORDER — HYDROXYZINE HYDROCHLORIDE 25 MG/1
50 TABLET, FILM COATED ORAL 3 TIMES DAILY
Status: DISCONTINUED | OUTPATIENT
Start: 2024-04-22 | End: 2024-04-24 | Stop reason: HOSPADM

## 2024-04-21 RX ORDER — HYDROXYZINE HYDROCHLORIDE 25 MG/1
50 TABLET, FILM COATED ORAL 3 TIMES DAILY PRN
Status: DISCONTINUED | OUTPATIENT
Start: 2024-04-21 | End: 2024-04-21

## 2024-04-21 RX ORDER — HYDROXYZINE HYDROCHLORIDE 50 MG/ML
25 INJECTION, SOLUTION INTRAMUSCULAR EVERY 6 HOURS PRN
Status: CANCELLED | OUTPATIENT
Start: 2024-04-21

## 2024-04-21 RX ORDER — NIFEDIPINE 10 MG/1
10 CAPSULE ORAL ONCE
Status: COMPLETED | OUTPATIENT
Start: 2024-04-21 | End: 2024-04-21

## 2024-04-21 RX ORDER — BUSPIRONE HYDROCHLORIDE 5 MG/1
10 TABLET ORAL 3 TIMES DAILY
Status: DISCONTINUED | OUTPATIENT
Start: 2024-04-21 | End: 2024-04-24 | Stop reason: HOSPADM

## 2024-04-21 RX ORDER — SODIUM CHLORIDE, SODIUM LACTATE, POTASSIUM CHLORIDE, CALCIUM CHLORIDE 600; 310; 30; 20 MG/100ML; MG/100ML; MG/100ML; MG/100ML
75 INJECTION, SOLUTION INTRAVENOUS CONTINUOUS
Status: DISCONTINUED | OUTPATIENT
Start: 2024-04-21 | End: 2024-04-24

## 2024-04-21 RX ADMIN — NIFEDIPINE 10 MG: 10 CAPSULE ORAL at 17:37

## 2024-04-21 RX ADMIN — OXYCODONE 5 MG: 5 TABLET ORAL at 16:07

## 2024-04-21 RX ADMIN — ACETAMINOPHEN 650 MG: 325 TABLET ORAL at 03:22

## 2024-04-21 RX ADMIN — IBUPROFEN 600 MG: 600 TABLET, FILM COATED ORAL at 23:46

## 2024-04-21 RX ADMIN — PRENATAL VITAMINS-IRON FUMARATE 27 MG IRON-FOLIC ACID 0.8 MG TABLET 1 TABLET: at 08:13

## 2024-04-21 RX ADMIN — BUSPIRONE HYDROCHLORIDE 10 MG: 10 TABLET ORAL at 20:53

## 2024-04-21 RX ADMIN — OXYCODONE HYDROCHLORIDE 10 MG: 10 TABLET ORAL at 03:42

## 2024-04-21 RX ADMIN — MAGNESIUM SULFATE HEPTAHYDRATE 2 G/HR: 40 INJECTION, SOLUTION INTRAVENOUS at 19:50

## 2024-04-21 RX ADMIN — IBUPROFEN 600 MG: 600 TABLET, FILM COATED ORAL at 12:00

## 2024-04-21 RX ADMIN — BUPRENORPHINE 22 MG: 8 TABLET SUBLINGUAL at 08:13

## 2024-04-21 RX ADMIN — IBUPROFEN 600 MG: 600 TABLET, FILM COATED ORAL at 17:37

## 2024-04-21 RX ADMIN — ACETAMINOPHEN 650 MG: 325 TABLET ORAL at 20:53

## 2024-04-21 RX ADMIN — ACETAMINOPHEN 650 MG: 325 TABLET ORAL at 08:13

## 2024-04-21 RX ADMIN — ACETAMINOPHEN 650 MG: 325 TABLET ORAL at 14:30

## 2024-04-21 RX ADMIN — SODIUM CHLORIDE, POTASSIUM CHLORIDE, SODIUM LACTATE AND CALCIUM CHLORIDE 75 ML/HR: 600; 310; 30; 20 INJECTION, SOLUTION INTRAVENOUS at 19:15

## 2024-04-21 RX ADMIN — IBUPROFEN 600 MG: 600 TABLET, FILM COATED ORAL at 06:11

## 2024-04-21 RX ADMIN — OXYCODONE 5 MG: 5 TABLET ORAL at 10:03

## 2024-04-21 RX ADMIN — ENOXAPARIN SODIUM 40 MG: 100 INJECTION SUBCUTANEOUS at 03:22

## 2024-04-21 RX ADMIN — HYDROXYZINE HYDROCHLORIDE 50 MG: 50 INJECTION, SOLUTION INTRAMUSCULAR at 21:30

## 2024-04-21 RX ADMIN — ENOXAPARIN SODIUM 40 MG: 100 INJECTION SUBCUTANEOUS at 16:07

## 2024-04-21 RX ADMIN — OXYCODONE HYDROCHLORIDE 10 MG: 10 TABLET ORAL at 20:53

## 2024-04-21 NOTE — PROGRESS NOTES
2024    Name:Vicky Melendrez    MR#:[id number]     PROGRESS NOTE:  Post-Op 2 S/P    HD:3    Subjective   28 y.o. yo Female  s/p CS at 40w3d doing well. Pain well controlled. Tolerating regular diet and having flatus. Lochia normal.  She denies headache, visual changes.      Objective    Vitals  Temp:  Temp:  [97.5 °F (36.4 °C)-98.5 °F (36.9 °C)] 97.8 °F (36.6 °C)  Temp src: Oral  BP:  BP: (117-155)/(60-86) 117/60  Pulse:  Heart Rate:  [70-85] 70  RR:   Resp:  [12-16] 12    General Awake, alert, no distress  Abdomen Soft, non-distended, fundus firm, below umbilicus, appropriately tender  Incision  Intact, no erythema or exudate  Extremities Calves NT bilaterally     I/O last 3 completed shifts:  In: -   Out: 1300 [Urine:1300]    LABS:   Lab Results   Component Value Date    WBC 11.61 (H) 2024    HGB 10.1 (L) 2024    HCT 32.0 (L) 2024    MCV 92.8 2024     2024       Infant: female       Assessment   1.  POD 2, doing well   2.  GHTN at term--- BP wnl now on no meds; asymptomatic.  Moderately elevated on 4/20     x1.  BP wnl after Rx for pain.   3.  MO--- Lovenox daily   4.  Hx substance abuse--- on Subutex    Plan: Routine postoperative care      Active Problems:   None      Goldie Lindsey, APRN  2024 08:24 EDT

## 2024-04-22 LAB — REF LAB TEST METHOD: NORMAL

## 2024-04-22 PROCEDURE — 25010000002 ENOXAPARIN PER 10 MG: Performed by: OBSTETRICS & GYNECOLOGY

## 2024-04-22 PROCEDURE — 25010000002 MAGNESIUM SULFATE 20 GM/500ML SOLUTION: Performed by: OBSTETRICS & GYNECOLOGY

## 2024-04-22 PROCEDURE — 25810000003 LACTATED RINGERS PER 1000 ML: Performed by: OBSTETRICS & GYNECOLOGY

## 2024-04-22 RX ORDER — ACETAMINOPHEN 325 MG/1
650 TABLET ORAL EVERY 4 HOURS PRN
Status: DISCONTINUED | OUTPATIENT
Start: 2024-04-22 | End: 2024-04-23

## 2024-04-22 RX ADMIN — BUSPIRONE HYDROCHLORIDE 10 MG: 10 TABLET ORAL at 21:05

## 2024-04-22 RX ADMIN — IBUPROFEN 600 MG: 600 TABLET, FILM COATED ORAL at 17:38

## 2024-04-22 RX ADMIN — HYDROXYZINE HYDROCHLORIDE 50 MG: 25 TABLET, FILM COATED ORAL at 09:42

## 2024-04-22 RX ADMIN — ENOXAPARIN SODIUM 40 MG: 100 INJECTION SUBCUTANEOUS at 03:49

## 2024-04-22 RX ADMIN — BUSPIRONE HYDROCHLORIDE 10 MG: 10 TABLET ORAL at 15:44

## 2024-04-22 RX ADMIN — SODIUM CHLORIDE, POTASSIUM CHLORIDE, SODIUM LACTATE AND CALCIUM CHLORIDE 75 ML/HR: 600; 310; 30; 20 INJECTION, SOLUTION INTRAVENOUS at 14:37

## 2024-04-22 RX ADMIN — HYDROXYZINE HYDROCHLORIDE 50 MG: 25 TABLET, FILM COATED ORAL at 21:05

## 2024-04-22 RX ADMIN — BUPRENORPHINE 22 MG: 8 TABLET SUBLINGUAL at 10:29

## 2024-04-22 RX ADMIN — MAGNESIUM SULFATE HEPTAHYDRATE 2 G/HR: 40 INJECTION, SOLUTION INTRAVENOUS at 13:29

## 2024-04-22 RX ADMIN — DOCUSATE SODIUM 100 MG: 100 CAPSULE, LIQUID FILLED ORAL at 21:05

## 2024-04-22 RX ADMIN — OXYCODONE 5 MG: 5 TABLET ORAL at 19:42

## 2024-04-22 RX ADMIN — ACETAMINOPHEN 650 MG: 325 TABLET ORAL at 13:28

## 2024-04-22 RX ADMIN — IBUPROFEN 600 MG: 600 TABLET, FILM COATED ORAL at 05:31

## 2024-04-22 RX ADMIN — ACETAMINOPHEN 650 MG: 325 TABLET ORAL at 09:42

## 2024-04-22 RX ADMIN — PRENATAL VITAMINS-IRON FUMARATE 27 MG IRON-FOLIC ACID 0.8 MG TABLET 1 TABLET: at 09:42

## 2024-04-22 RX ADMIN — OXYCODONE 5 MG: 5 TABLET ORAL at 08:21

## 2024-04-22 RX ADMIN — SODIUM CHLORIDE, POTASSIUM CHLORIDE, SODIUM LACTATE AND CALCIUM CHLORIDE 75 ML/HR: 600; 310; 30; 20 INJECTION, SOLUTION INTRAVENOUS at 01:33

## 2024-04-22 RX ADMIN — DOCUSATE SODIUM 100 MG: 100 CAPSULE, LIQUID FILLED ORAL at 11:43

## 2024-04-22 RX ADMIN — OXYCODONE HYDROCHLORIDE 10 MG: 10 TABLET ORAL at 01:30

## 2024-04-22 RX ADMIN — SERTRALINE HYDROCHLORIDE 50 MG: 50 TABLET ORAL at 09:42

## 2024-04-22 RX ADMIN — ENOXAPARIN SODIUM 40 MG: 100 INJECTION SUBCUTANEOUS at 15:44

## 2024-04-22 RX ADMIN — BUSPIRONE HYDROCHLORIDE 10 MG: 10 TABLET ORAL at 09:42

## 2024-04-22 RX ADMIN — IBUPROFEN 600 MG: 600 TABLET, FILM COATED ORAL at 23:56

## 2024-04-22 RX ADMIN — ACETAMINOPHEN 650 MG: 325 TABLET ORAL at 03:46

## 2024-04-22 RX ADMIN — IBUPROFEN 600 MG: 600 TABLET, FILM COATED ORAL at 11:43

## 2024-04-22 RX ADMIN — HYDROXYZINE HYDROCHLORIDE 50 MG: 25 TABLET, FILM COATED ORAL at 15:44

## 2024-04-22 RX ADMIN — MAGNESIUM SULFATE HEPTAHYDRATE 2 G/HR: 40 INJECTION, SOLUTION INTRAVENOUS at 01:32

## 2024-04-22 NOTE — CASE MANAGEMENT/SOCIAL WORK
Continued Stay Note  [facility]     Patient Name: Vicky Melendrez  MRN: [id number]  Today's Date: 4/22/2024    Admit Date: 4/18/2024    Plan: MSW available   Discharge Plan       Row Name 04/22/24 0744       Plan    Plan MSW available    Plan Comments Met with pt on Friday 4/19/2024- Visited pt. She is prescribed buprenorphine by [facility]. Reports she has been clean for 6 months. Is currently living in sober living at [address] in [place]. She does not have custody of her older children but does have a relationship with them. Reports FOB also lives in a sober living in [place]. Discussed [facility]/ [facility] and provided [facility] brochure. Verified pt's prescription at [facility] [phone]- last rx filled on 4/15/2024. Mother is aware of 5 day stay    Final Discharge Disposition Code 01 - home or self-care                   Discharge Codes    No documentation.                       QUINN Mcgrath

## 2024-04-22 NOTE — LACTATION NOTE
04/22/24 0945   Maternal Information   Date of Referral 04/22/24   Person Making Referral nurse  (Antepartum nurse called with concern about patient's pumping/breastfeeding status.)   Maternal Reason for Referral other (see comments)  (Mom wants to pump, but her home pump is at home.)   Maternal Assessment   Breast Size Issue none   Breast Shape Bilateral:;round;pendulous   Breast Density Bilateral:;soft   Nipples everted;short   Left Nipple Symptoms intact;nontender   Right Nipple Symptoms intact;nontender   Milk Expression/Equipment   Breast Pump Type double electric, personal;single electric, hospital grade  (Mom said she has a home Zomee pump at home, and a hospital pump was set up for her use in the hospital.)   Breast Pumping   Breast Pumping Interventions frequent pumping encouraged  (Mom was encouraged to pump every 3 hours if she wants to encourage an adequate milk supply.)     Mom has decided she wants to try pumping and just bottle feeding for now.  A hospital pump was set up for her use, and she was strongly encouraged to pump every 3 hours.  She was shown how to use the pump and encouraged to wash pump parts after use.

## 2024-04-22 NOTE — PLAN OF CARE
Problem: Adult Inpatient Plan of Care  Goal: Plan of Care Review  4/22/2024 1848 by Terra Norman RN  Outcome: Ongoing, Progressing  Flowsheets (Taken 4/22/2024 1848)  Progress: improving  Plan of Care Reviewed With:   patient   spouse  4/22/2024 1845 by Terra Norman RN  Outcome: Ongoing, Progressing  Flowsheets (Taken 4/22/2024 1845)  Progress: improving  Plan of Care Reviewed With:   patient   spouse  Goal: Patient-Specific Goal (Individualized)  4/22/2024 1848 by Terra Norman RN  Outcome: Ongoing, Progressing  4/22/2024 1845 by Terra Norman RN  Outcome: Ongoing, Progressing  Goal: Absence of Hospital-Acquired Illness or Injury  4/22/2024 1848 by Terra Norman RN  Outcome: Ongoing, Progressing  4/22/2024 1845 by Terra Norman RN  Outcome: Ongoing, Progressing  Intervention: Identify and Manage Fall Risk  Description: Perform standard risk assessment on admission using a validated tool or comprehensive approach appropriate to the patient; reassess fall risk frequently, with change in status or transfer to another level of care.  Communicate fall injury risk to interprofessional healthcare team.  Determine need for increased observation, equipment and environmental modification, such as low bed, signage and supportive, nonskid footwear.  Adjust safety measures to individual developmental age, stage and identified risk factors.  Reinforce the importance of safety and physical activity with patient and family.  Perform regular intentional rounding to assess need for position change, pain assessment and personal needs, including assistance with toileting.  Recent Flowsheet Documentation  Taken 4/22/2024 1832 by Terra Norman RN  Safety Promotion/Fall Prevention:   safety round/check completed   assistive device/personal items within reach   clutter free environment maintained   fall prevention program maintained   nonskid shoes/slippers when out of bed  Taken 4/22/2024 1738 by Terra Norman  RN  Safety Promotion/Fall Prevention:   safety round/check completed   clutter free environment maintained   assistive device/personal items within reach   fall prevention program maintained   nonskid shoes/slippers when out of bed  Taken 4/22/2024 1633 by Terra Norman RN  Safety Promotion/Fall Prevention:   safety round/check completed   assistive device/personal items within reach   clutter free environment maintained   fall prevention program maintained   nonskid shoes/slippers when out of bed  Taken 4/22/2024 1538 by Terra Norman RN  Safety Promotion/Fall Prevention:   safety round/check completed   assistive device/personal items within reach   clutter free environment maintained   fall prevention program maintained   nonskid shoes/slippers when out of bed  Taken 4/22/2024 1328 by Terra Norman RN  Safety Promotion/Fall Prevention:   safety round/check completed   clutter free environment maintained   assistive device/personal items within reach   fall prevention program maintained   nonskid shoes/slippers when out of bed  Taken 4/22/2024 1238 by Terra Norman RN  Safety Promotion/Fall Prevention:   safety round/check completed   clutter free environment maintained   assistive device/personal items within reach   fall prevention program maintained   nonskid shoes/slippers when out of bed  Taken 4/22/2024 1143 by Terra Norman RN  Safety Promotion/Fall Prevention:   safety round/check completed   assistive device/personal items within reach   clutter free environment maintained   fall prevention program maintained   nonskid shoes/slippers when out of bed  Taken 4/22/2024 1030 by Terra Norman RN  Safety Promotion/Fall Prevention:   safety round/check completed   assistive device/personal items within reach   clutter free environment maintained   fall prevention program maintained   nonskid shoes/slippers when out of bed  Taken 4/22/2024 0942 by Terra Norman RN  Safety Promotion/Fall  Prevention:   safety round/check completed   clutter free environment maintained   assistive device/personal items within reach   fall prevention program maintained   nonskid shoes/slippers when out of bed  Taken 4/22/2024 0821 by Terra Norman RN  Safety Promotion/Fall Prevention:   safety round/check completed   assistive device/personal items within reach   clutter free environment maintained   fall prevention program maintained   nonskid shoes/slippers when out of bed  Taken 4/22/2024 0737 by Terra Norman RN  Safety Promotion/Fall Prevention:   safety round/check completed   assistive device/personal items within reach   clutter free environment maintained   fall prevention program maintained   nonskid shoes/slippers when out of bed  Intervention: Prevent Skin Injury  Description: Perform a screening for skin injury risk, such as pressure or moisture associated skin damage on admission and at regular intervals throughout hospital stay.  Keep all areas of skin (especially folds) clean and dry.  Maintain adequate skin hydration.  Relieve and redistribute pressure and protect bony prominences; implement measures based on patient-specific risk factors.  Match turning and repositioning schedule to clinical condition.  Encourage weight shift frequently; assist with reposition if unable to complete independently.  Float heels off bed; avoid pressure on the Achilles tendon.  Keep skin free from extended contact with medical devices.  Encourage functional activity and mobility, as early as tolerated.  Use aids (e.g., slide boards, mechanical lift) during transfer.  Recent Flowsheet Documentation  Taken 4/22/2024 1538 by Terra Norman RN  Body Position: sitting up in bed  Taken 4/22/2024 0737 by Terra Norman RN  Body Position:   side-lying   right  Intervention: Prevent and Manage VTE (Venous Thromboembolism) Risk  Description: Assess for VTE (venous thromboembolism) risk.  Encourage and assist with early  ambulation.  Initiate and maintain compression or other therapy, as indicated, based on identified risk in accordance with organizational protocol and provider order.  Encourage both active and passive leg exercises while in bed, if unable to ambulate.  Recent Flowsheet Documentation  Taken 4/22/2024 1633 by Terra Norman RN  Activity Management: back to bed  Taken 4/22/2024 1628 by Terra Norman RN  Activity Management: back to bed  Taken 4/22/2024 1626 by Terra Norman RN  Activity Management: ambulated to bathroom  Taken 4/22/2024 1538 by Terra Norman RN  Activity Management: up ad anabela  VTE Prevention/Management:   bilateral   sequential compression devices on  Taken 4/22/2024 1232 by Terra Norman RN  Activity Management: back to bed  Taken 4/22/2024 1230 by Terra Norman RN  Activity Management: ambulated to bathroom  Taken 4/22/2024 0742 by Terra Norman RN  Activity Management: back to bed  Taken 4/22/2024 0740 by Terra Norman RN  Activity Management: ambulated to bathroom  Taken 4/22/2024 0737 by Terra Norman RN  Activity Management: up ad anabela  VTE Prevention/Management:   bilateral   sequential compression devices on  Intervention: Prevent Infection  Description: Maintain skin and mucous membrane integrity; promote hand, oral and pulmonary hygiene.  Optimize fluid balance, nutrition, sleep and glycemic control to maximize infection resistance.  Identify potential sources of infection early to prevent or mitigate progression of infection (e.g., wound, lines, devices).  Evaluate ongoing need for invasive devices; remove promptly when no longer indicated.  Recent Flowsheet Documentation  Taken 4/22/2024 1832 by Terra Norman RN  Infection Prevention:   personal protective equipment utilized   single patient room provided   hand hygiene promoted  Taken 4/22/2024 1738 by Terra Norman RN  Infection Prevention:   personal protective equipment utilized   single patient room provided   hand  hygiene promoted  Taken 4/22/2024 1633 by Terra Norman RN  Infection Prevention:   single patient room provided   hand hygiene promoted   personal protective equipment utilized  Taken 4/22/2024 1538 by Terra Norman RN  Infection Prevention:   single patient room provided   personal protective equipment utilized   hand hygiene promoted  Taken 4/22/2024 1328 by Terra Norman RN  Infection Prevention:   personal protective equipment utilized   single patient room provided   hand hygiene promoted  Taken 4/22/2024 1238 by Terra Norman RN  Infection Prevention:   personal protective equipment utilized   single patient room provided   hand hygiene promoted  Taken 4/22/2024 1143 by Terra Norman RN  Infection Prevention:   personal protective equipment utilized   single patient room provided   hand hygiene promoted  Taken 4/22/2024 1030 by Terra Norman RN  Infection Prevention:   personal protective equipment utilized   single patient room provided   hand hygiene promoted  Taken 4/22/2024 0942 by Terra oNrman RN  Infection Prevention:   personal protective equipment utilized   single patient room provided   hand hygiene promoted  Taken 4/22/2024 0821 by Terra Norman RN  Infection Prevention:   personal protective equipment utilized   single patient room provided   hand hygiene promoted  Taken 4/22/2024 0737 by Terra Norman RN  Infection Prevention:   personal protective equipment utilized   single patient room provided   hand hygiene promoted  Goal: Optimal Comfort and Wellbeing  4/22/2024 1848 by Terra Norman RN  Outcome: Ongoing, Progressing  4/22/2024 1845 by Terra Norman RN  Outcome: Ongoing, Progressing  Intervention: Monitor Pain and Promote Comfort  Description: Assess pain level, treatment efficacy and patient response at regular intervals using a consistent pain scale.  Consider the presence and impact of preexisting chronic pain.  Encourage patient and caregiver involvement in pain  assessment, interventions and safety measures.  Recent Flowsheet Documentation  Taken 4/22/2024 1832 by Terra Norman RN  Pain Management Interventions:   pillow support provided   position adjusted   see MAR  Taken 4/22/2024 1738 by Terra Norman RN  Pain Management Interventions: (ibuprofen given)   pillow support provided   position adjusted   see MAR  Taken 4/22/2024 1633 by Terra Norman RN  Pain Management Interventions:   pillow support provided   position adjusted   see MAR  Taken 4/22/2024 1538 by Terra Norman RN  Pain Management Interventions:   pillow support provided   position adjusted   see MAR  Taken 4/22/2024 1436 by Terra Norman RN  Pain Management Interventions:   pillow support provided   position adjusted   see MAR  Taken 4/22/2024 1328 by Terra Norman RN  Pain Management Interventions: (tylenol given)   position adjusted   pillow support provided   see MAR  Taken 4/22/2024 1238 by Terra Norman RN  Pain Management Interventions:   position adjusted   pillow support provided   see MAR  Taken 4/22/2024 1143 by Terra Norman RN  Pain Management Interventions: (ibuprofen 00 mg given)   position adjusted   pillow support provided   see MAR  Taken 4/22/2024 1030 by Terra Norman RN  Pain Management Interventions:   position adjusted   pillow support provided   see MAR  Taken 4/22/2024 0942 by Terra Norman RN  Pain Management Interventions:   pillow support provided   position adjusted   see MAR  Taken 4/22/2024 0821 by Terra Norman RN  Pain Management Interventions: (oxycodone 5 mg)   pillow support provided   position adjusted   see MAR  Taken 4/22/2024 0737 by Terra Norman RN  Pain Management Interventions: (tylenol/ibuprofen)   position adjusted   pillow support provided   pain management plan reviewed with patient/caregiver   see MAR  Intervention: Provide Person-Centered Care  Description: Use a family-focused approach to care.  Develop trust and rapport by  proactively providing information, encouraging questions, addressing concerns and offering reassurance.  Acknowledge emotional response to hospitalization.  Recognize and utilize personal coping strategies.  Honor spiritual and cultural preferences.  Recent Flowsheet Documentation  Taken 4/22/2024 1538 by Terra Norman RN  Trust Relationship/Rapport:   care explained   choices provided   emotional support provided   empathic listening provided   questions answered   questions encouraged   reassurance provided   thoughts/feelings acknowledged  Taken 4/22/2024 0737 by Terra Norman RN  Trust Relationship/Rapport:   care explained   choices provided   emotional support provided   empathic listening provided   questions answered   reassurance provided   questions encouraged   thoughts/feelings acknowledged  Goal: Readiness for Transition of Care  4/22/2024 1848 by Terra Norman RN  Outcome: Ongoing, Progressing  4/22/2024 1845 by Terra Norman RN  Outcome: Ongoing, Progressing     Problem: Hypertensive Disorders in Pregnancy  Goal: Maternal-Fetal Stabilization  4/22/2024 1848 by Terra Norman RN  Outcome: Ongoing, Progressing  4/22/2024 1845 by Terra Norman RN  Outcome: Ongoing, Progressing  Intervention: Monitor and Manage Symptom Progression  Description: Note behavioral changes (e.g., restlessness).  Monitor for report of headache not relieved by pharmacologic therapy or visual disturbance.  Implement seizure precautions.  Evaluate any complaint of epigastric or abdominal pain.  Anticipate initiation and titration of magnesium sulfate infusion for seizure prevention. Note: Magnesium sulfate has also been identified to provide neuroprotection with gestations of less than 32 weeks.  Assess for signs of bleeding (vaginal or other sites, such as intravenous site, gums).  Evaluate for presence of respiratory compromise by regularly auscultating breath sounds and monitoring pulse oximetry (continuous if on  magnesium sulfate); report presence of chest pain, decreased oxygen saturation, cough and shortness of breath.  Provide calm, reassuring presence; offer clear explanation of events.  Prepare for delivery, planned or emergent, based on change in maternal-fetal status.  Recent Flowsheet Documentation  Taken 2024 1832 by Terra Norman RN  Medication Review/Management: medications reviewed  Taken 2024 1738 by Terra Norman RN  Medication Review/Management: medications reviewed  Taken 2024 1633 by Terra Norman RN  Medication Review/Management: medications reviewed  Taken 2024 1538 by Terra Norman RN  Medication Review/Management: medications reviewed  Taken 2024 1328 by Terra Norman RN  Medication Review/Management:   medications reviewed   high-risk medications identified  Taken 2024 1238 by Terra Norman RN  Medication Review/Management: medications reviewed  Taken 2024 1143 by Terra Norman RN  Medication Review/Management: medications reviewed  Taken 2024 1030 by Terra Norman RN  Medication Review/Management:   medications reviewed   high-risk medications identified  Taken 2024 0942 by Terra Norman RN  Medication Review/Management:   medications reviewed   high-risk medications identified  Taken 2024 0821 by Terra Norman RN  Medication Review/Management:   medications reviewed   high-risk medications identified  Taken 2024 0737 by Terra Norman RN  Medication Review/Management:   medications reviewed   high-risk medications identified     Problem: Adjustment to Role Transition (Postpartum  Delivery)  Goal: Successful Maternal Role Transition  2024 184 by Terra Norman RN  Outcome: Ongoing, Progressing  2024 1845 by Terra Norman RN  Outcome: Ongoing, Progressing     Problem: Bleeding (Postpartum  Delivery)  Goal: Hemostasis  2024 184 by Terra Norman RN  Outcome: Ongoing, Progressing  2024  184 by Terra Norman RN  Outcome: Ongoing, Progressing     Problem: Infection (Postpartum  Delivery)  Goal: Absence of Infection Signs and Symptoms  2024 by Terra Norman RN  Outcome: Ongoing, Progressing  2024 by Terra Norman RN  Outcome: Ongoing, Progressing  Intervention: Prevent or Manage Infection  Description: Optimize fluid balance, nutrition, sleep, oxygenation, glycemic control and body temperature to maximize resistance.  Maintain dressing and closed drainage system integrity to reduce the risk for infection; inspect incision as visible.  Consider splinting techniques to prevent incision dehiscence.  Discontinue prophylactic antimicrobial agent within 24 hours after procedure, as directed.  Identify potential sources of infection early; evaluate continued need and advocate for early removal (e.g., lines, devices).  Implement transmission-based precautions and isolation, as indicated, to prevent spread of infection.  Administer ordered antimicrobial therapy promptly; reassess need regularly.  Note: If endometritis is suspected, treatment may be initiated without obtaining cultures.  Identify and manage signs of early sepsis, such as increased heart rate and decreased blood pressure, as well as changes in mental state, respiratory pattern or peripheral perfusion.  Provide fever-reduction and comfort measures.  Recent Flowsheet Documentation  Taken 2024 1538 by Terra Norman RN  Infection Management: aseptic technique maintained  Taken 2024 0737 by Terra Norman RN  Infection Management: aseptic technique maintained     Problem: Pain (Postpartum  Delivery)  Goal: Acceptable Pain Control  2024 by Terra Norman RN  Outcome: Ongoing, Progressing  2024 by Terra Norman RN  Outcome: Ongoing, Progressing  Intervention: Prevent or Manage Pain  Description: Determine pain management plan with patient and caregiver/family; review plan  regularly.  Monitor for the presence of incisional pain; provide timely pain management interventions when present.  Use cold application, as culturally-appropriate, to the incisional area for the first 24 to 48 hours to enhance comfort.  Encourage early ambulation, when able, to help avoid gas accumulation which can add to discomfort.  Verify correct infant latch when breastfeeding to prevent nipple pain.  Consider the presence and impact of preexisting chronic pain.  Encourage patient and caregiver involvement in pain assessment, interventions and safety measures.  Evaluate risk for opioid use; individualize pharmacologic pain management plan and titrate medication to patient response.  Combine multimodal analgesia and nonpharmacologic strategies to help potentiate synergistic effects and enhance comfort (e.g., complementary therapy, diversional activity).  Provide around-the-clock dosing of pain medication to keep pain levels in control.  Manage medication-induced effects, such as constipation, nausea, vomiting.  Support and optimize psychosocial response to pain.  If engorgement occurs, encourage more frequent breastfeeding or pumping and storing additional milk to ease discomfort. Note: Cold compresses, as culturally-appropriate, may be used if bottle-feeding.  If post-dural puncture headache identified, encourage adequate hydration and anticipate the need for epidural blood patch.  If hemorrhoids are present and painful, offer topical pain relief and sitz baths for comfort.  Recent Flowsheet Documentation  Taken 4/22/2024 1832 by Terra Norman, RN  Pain Management Interventions:   pillow support provided   position adjusted   see MAR  Taken 4/22/2024 1738 by Terra Norman, RN  Pain Management Interventions: (ibuprofen given)   pillow support provided   position adjusted   see MAR  Taken 4/22/2024 1633 by eTrra Norman, RN  Pain Management Interventions:   pillow support provided   position adjusted   see  MAR  Taken 2024 1538 by Terra Norman RN  Pain Management Interventions:   pillow support provided   position adjusted   see MAR  Taken 2024 1436 by Terra Norman RN  Pain Management Interventions:   pillow support provided   position adjusted   see MAR  Taken 2024 1328 by Terra Norman RN  Pain Management Interventions: (tylenol given)   position adjusted   pillow support provided   see MAR  Taken 2024 1238 by Terra Norman RN  Pain Management Interventions:   position adjusted   pillow support provided   see MAR  Taken 2024 1143 by Terra Norman RN  Pain Management Interventions: (ibuprofen 00 mg given)   position adjusted   pillow support provided   see MAR  Taken 2024 1030 by Terra Norman RN  Pain Management Interventions:   position adjusted   pillow support provided   see MAR  Taken 2024 0942 by Terra Norman RN  Pain Management Interventions:   pillow support provided   position adjusted   see MAR  Taken 2024 0821 by Terra Norman RN  Pain Management Interventions: (oxycodone 5 mg)   pillow support provided   position adjusted   see MAR  Taken 2024 0737 by Terra Norman RN  Pain Management Interventions: (tylenol/ibuprofen)   position adjusted   pillow support provided   pain management plan reviewed with patient/caregiver   see MAR     Problem: Postoperative Nausea and Vomiting (Postpartum  Delivery)  Goal: Nausea and Vomiting Relief  2024 1848 by Terra Norman RN  Outcome: Ongoing, Progressing  2024 1845 by Terra Norman RN  Outcome: Ongoing, Progressing     Problem: Postoperative Urinary Retention (Postpartum  Delivery)  Goal: Effective Urinary Elimination  2024 1848 by Terra Norman RN  Outcome: Ongoing, Progressing  2024 1845 by Terra Norman RN  Outcome: Ongoing, Progressing     Problem: Breastfeeding  Goal: Effective Breastfeeding  2024 184 by Terra Norman RN  Outcome: Ongoing,  Progressing  4/22/2024 1845 by Terra Norman, RN  Outcome: Ongoing, Progressing   Goal Outcome Evaluation:  Plan of Care Reviewed With: patient, spouse        Progress: improving

## 2024-04-22 NOTE — PROGRESS NOTES
VAISHNAVI Ward   PROGRESS NOTE    Post-Op Day 4 S/P   Subjective     Patient reports:  Pain is  controlled with .  She is  ambulating. Tolerating diet. Tolerating po -- normal.  Intake -- c/o of tolerating po solids.   Voiding - without difficulty; flatus reported..  Vaginal bleeding is as much as expected.      Objective      Vitals: Vital Signs Range for the last 24 hours  Temperature: Temp:  [97.4 °F (36.3 °C)-98.4 °F (36.9 °C)] 97.6 °F (36.4 °C)   Temp Source: Temp src: Axillary   BP: BP: (106-187)/(55-99) 127/60   Pulse: Heart Rate:  [65-99] 81   Respirations: Resp:  [16-20] 20   SPO2: SpO2:  [94 %-100 %] 98 %   O2 Amount (l/min):     O2 Devices Device (Oxygen Therapy): room air   Weight:              Physical Exam    Lungs clear to auscultation bilaterally   Abdomen Soft, non-tender, normal bowel sounds; no bruits, organomegaly or masses.   Incision  healing well   Extremities extremities normal, atraumatic, no cyanosis or edema     I reviewed the patient's new clinical results.  Patient is on magnesium sulfate at this time and her blood pressures have been normal.  We will stop her magnesium tonight at the 24-hour ale and transfer back up to mother-baby.  Will continue to watch her blood pressures.    Assessment & Plan        Status post  section    Preeclampsia in postpartum period      Assessment:    Vicky Melendrez is Day 4  post-partum  , Low Transverse   .       Plan:  continue post op care.        Sandeep Pierce MD  2024  14:40 EDT

## 2024-04-22 NOTE — PLAN OF CARE
Problem: Adult Inpatient Plan of Care  Goal: Plan of Care Review  Outcome: Ongoing, Progressing  Flowsheets (Taken 4/22/2024 1845)  Progress: improving  Plan of Care Reviewed With:   patient   spouse  Goal: Patient-Specific Goal (Individualized)  Outcome: Ongoing, Progressing  Goal: Absence of Hospital-Acquired Illness or Injury  Outcome: Ongoing, Progressing  Intervention: Identify and Manage Fall Risk  Description: Perform standard risk assessment on admission using a validated tool or comprehensive approach appropriate to the patient; reassess fall risk frequently, with change in status or transfer to another level of care.  Communicate fall injury risk to interprofessional healthcare team.  Determine need for increased observation, equipment and environmental modification, such as low bed, signage and supportive, nonskid footwear.  Adjust safety measures to individual developmental age, stage and identified risk factors.  Reinforce the importance of safety and physical activity with patient and family.  Perform regular intentional rounding to assess need for position change, pain assessment and personal needs, including assistance with toileting.  Recent Flowsheet Documentation  Taken 4/22/2024 1832 by Terra Norman, RN  Safety Promotion/Fall Prevention:   safety round/check completed   assistive device/personal items within reach   clutter free environment maintained   fall prevention program maintained   nonskid shoes/slippers when out of bed  Taken 4/22/2024 1738 by Terra Norman, RN  Safety Promotion/Fall Prevention:   safety round/check completed   clutter free environment maintained   assistive device/personal items within reach   fall prevention program maintained   nonskid shoes/slippers when out of bed  Taken 4/22/2024 1633 by Terra Norman, RN  Safety Promotion/Fall Prevention:   safety round/check completed   assistive device/personal items within reach   clutter free environment maintained    fall prevention program maintained   nonskid shoes/slippers when out of bed  Taken 4/22/2024 1538 by Terra Norman RN  Safety Promotion/Fall Prevention:   safety round/check completed   assistive device/personal items within reach   clutter free environment maintained   fall prevention program maintained   nonskid shoes/slippers when out of bed  Taken 4/22/2024 1328 by Terra Norman RN  Safety Promotion/Fall Prevention:   safety round/check completed   clutter free environment maintained   assistive device/personal items within reach   fall prevention program maintained   nonskid shoes/slippers when out of bed  Taken 4/22/2024 1238 by Terra Norman RN  Safety Promotion/Fall Prevention:   safety round/check completed   clutter free environment maintained   assistive device/personal items within reach   fall prevention program maintained   nonskid shoes/slippers when out of bed  Taken 4/22/2024 1143 by Terra Norman RN  Safety Promotion/Fall Prevention:   safety round/check completed   assistive device/personal items within reach   clutter free environment maintained   fall prevention program maintained   nonskid shoes/slippers when out of bed  Taken 4/22/2024 1030 by Terra Norman RN  Safety Promotion/Fall Prevention:   safety round/check completed   assistive device/personal items within reach   clutter free environment maintained   fall prevention program maintained   nonskid shoes/slippers when out of bed  Taken 4/22/2024 0942 by Terra Norman RN  Safety Promotion/Fall Prevention:   safety round/check completed   clutter free environment maintained   assistive device/personal items within reach   fall prevention program maintained   nonskid shoes/slippers when out of bed  Taken 4/22/2024 0821 by Terra Norman RN  Safety Promotion/Fall Prevention:   safety round/check completed   assistive device/personal items within reach   clutter free environment maintained   fall prevention program maintained    nonskid shoes/slippers when out of bed  Taken 4/22/2024 0737 by Terra Norman RN  Safety Promotion/Fall Prevention:   safety round/check completed   assistive device/personal items within reach   clutter free environment maintained   fall prevention program maintained   nonskid shoes/slippers when out of bed  Intervention: Prevent Skin Injury  Description: Perform a screening for skin injury risk, such as pressure or moisture associated skin damage on admission and at regular intervals throughout hospital stay.  Keep all areas of skin (especially folds) clean and dry.  Maintain adequate skin hydration.  Relieve and redistribute pressure and protect bony prominences; implement measures based on patient-specific risk factors.  Match turning and repositioning schedule to clinical condition.  Encourage weight shift frequently; assist with reposition if unable to complete independently.  Float heels off bed; avoid pressure on the Achilles tendon.  Keep skin free from extended contact with medical devices.  Encourage functional activity and mobility, as early as tolerated.  Use aids (e.g., slide boards, mechanical lift) during transfer.  Recent Flowsheet Documentation  Taken 4/22/2024 1538 by Terra Norman RN  Body Position: sitting up in bed  Taken 4/22/2024 0737 by Terra Norman RN  Body Position:   side-lying   right  Intervention: Prevent and Manage VTE (Venous Thromboembolism) Risk  Description: Assess for VTE (venous thromboembolism) risk.  Encourage and assist with early ambulation.  Initiate and maintain compression or other therapy, as indicated, based on identified risk in accordance with organizational protocol and provider order.  Encourage both active and passive leg exercises while in bed, if unable to ambulate.  Recent Flowsheet Documentation  Taken 4/22/2024 1633 by Terra Norman RN  Activity Management: back to bed  Taken 4/22/2024 1628 by Terra Norman RN  Activity Management: back to  bed  Taken 4/22/2024 1626 by Terra Norman RN  Activity Management: ambulated to bathroom  Taken 4/22/2024 1538 by Terra Norman RN  Activity Management: up ad anabela  VTE Prevention/Management:   bilateral   sequential compression devices on  Taken 4/22/2024 1232 by Terra Norman RN  Activity Management: back to bed  Taken 4/22/2024 1230 by Terra Norman RN  Activity Management: ambulated to bathroom  Taken 4/22/2024 0742 by Terra Norman RN  Activity Management: back to bed  Taken 4/22/2024 0740 by Terra Norman RN  Activity Management: ambulated to bathroom  Taken 4/22/2024 0737 by Terra Norman RN  Activity Management: up ad anabela  VTE Prevention/Management:   bilateral   sequential compression devices on  Intervention: Prevent Infection  Description: Maintain skin and mucous membrane integrity; promote hand, oral and pulmonary hygiene.  Optimize fluid balance, nutrition, sleep and glycemic control to maximize infection resistance.  Identify potential sources of infection early to prevent or mitigate progression of infection (e.g., wound, lines, devices).  Evaluate ongoing need for invasive devices; remove promptly when no longer indicated.  Recent Flowsheet Documentation  Taken 4/22/2024 1832 by Terra Norman RN  Infection Prevention:   personal protective equipment utilized   single patient room provided   hand hygiene promoted  Taken 4/22/2024 1738 by Terra Norman RN  Infection Prevention:   personal protective equipment utilized   single patient room provided   hand hygiene promoted  Taken 4/22/2024 1633 by Terra Norman RN  Infection Prevention:   single patient room provided   hand hygiene promoted   personal protective equipment utilized  Taken 4/22/2024 1538 by Terra Norman RN  Infection Prevention:   single patient room provided   personal protective equipment utilized   hand hygiene promoted  Taken 4/22/2024 1328 by Terra Norman RN  Infection Prevention:   personal protective  equipment utilized   single patient room provided   hand hygiene promoted  Taken 4/22/2024 1238 by Terra Norman RN  Infection Prevention:   personal protective equipment utilized   single patient room provided   hand hygiene promoted  Taken 4/22/2024 1143 by Terra Norman RN  Infection Prevention:   personal protective equipment utilized   single patient room provided   hand hygiene promoted  Taken 4/22/2024 1030 by Terra Norman RN  Infection Prevention:   personal protective equipment utilized   single patient room provided   hand hygiene promoted  Taken 4/22/2024 0942 by Terra Norman RN  Infection Prevention:   personal protective equipment utilized   single patient room provided   hand hygiene promoted  Taken 4/22/2024 0821 by Terra Norman RN  Infection Prevention:   personal protective equipment utilized   single patient room provided   hand hygiene promoted  Taken 4/22/2024 0737 by Terra Norman RN  Infection Prevention:   personal protective equipment utilized   single patient room provided   hand hygiene promoted  Goal: Optimal Comfort and Wellbeing  Outcome: Ongoing, Progressing  Intervention: Monitor Pain and Promote Comfort  Description: Assess pain level, treatment efficacy and patient response at regular intervals using a consistent pain scale.  Consider the presence and impact of preexisting chronic pain.  Encourage patient and caregiver involvement in pain assessment, interventions and safety measures.  Recent Flowsheet Documentation  Taken 4/22/2024 1832 by Terra Norman RN  Pain Management Interventions:   pillow support provided   position adjusted   see MAR  Taken 4/22/2024 1738 by Terra Norman RN  Pain Management Interventions: (ibuprofen given)   pillow support provided   position adjusted   see MAR  Taken 4/22/2024 1633 by Terra Norman RN  Pain Management Interventions:   pillow support provided   position adjusted   see MAR  Taken 4/22/2024 1538 by Terra Norman  RN  Pain Management Interventions:   pillow support provided   position adjusted   see MAR  Taken 4/22/2024 1436 by Terra Norman RN  Pain Management Interventions:   pillow support provided   position adjusted   see MAR  Taken 4/22/2024 1328 by Terra Norman RN  Pain Management Interventions: (tylenol given)   position adjusted   pillow support provided   see MAR  Taken 4/22/2024 1238 by Terra Norman RN  Pain Management Interventions:   position adjusted   pillow support provided   see MAR  Taken 4/22/2024 1143 by Terra Norman RN  Pain Management Interventions: (ibuprofen 00 mg given)   position adjusted   pillow support provided   see MAR  Taken 4/22/2024 1030 by Terra Norman RN  Pain Management Interventions:   position adjusted   pillow support provided   see MAR  Taken 4/22/2024 0942 by Terra Norman RN  Pain Management Interventions:   pillow support provided   position adjusted   see MAR  Taken 4/22/2024 0821 by Terra Norman RN  Pain Management Interventions: (oxycodone 5 mg)   pillow support provided   position adjusted   see MAR  Taken 4/22/2024 0737 by Terra Norman RN  Pain Management Interventions: (tylenol/ibuprofen)   position adjusted   pillow support provided   pain management plan reviewed with patient/caregiver   see MAR  Intervention: Provide Person-Centered Care  Description: Use a family-focused approach to care.  Develop trust and rapport by proactively providing information, encouraging questions, addressing concerns and offering reassurance.  Acknowledge emotional response to hospitalization.  Recognize and utilize personal coping strategies.  Honor spiritual and cultural preferences.  Recent Flowsheet Documentation  Taken 4/22/2024 1538 by Terra Norman RN  Trust Relationship/Rapport:   care explained   choices provided   emotional support provided   empathic listening provided   questions answered   questions encouraged   reassurance provided   thoughts/feelings  acknowledged  Taken 4/22/2024 0737 by Terra Norman RN  Trust Relationship/Rapport:   care explained   choices provided   emotional support provided   empathic listening provided   questions answered   reassurance provided   questions encouraged   thoughts/feelings acknowledged  Goal: Readiness for Transition of Care  Outcome: Ongoing, Progressing     Problem: Hypertensive Disorders in Pregnancy  Goal: Maternal-Fetal Stabilization  Outcome: Ongoing, Progressing  Intervention: Monitor and Manage Symptom Progression  Description: Note behavioral changes (e.g., restlessness).  Monitor for report of headache not relieved by pharmacologic therapy or visual disturbance.  Implement seizure precautions.  Evaluate any complaint of epigastric or abdominal pain.  Anticipate initiation and titration of magnesium sulfate infusion for seizure prevention. Note: Magnesium sulfate has also been identified to provide neuroprotection with gestations of less than 32 weeks.  Assess for signs of bleeding (vaginal or other sites, such as intravenous site, gums).  Evaluate for presence of respiratory compromise by regularly auscultating breath sounds and monitoring pulse oximetry (continuous if on magnesium sulfate); report presence of chest pain, decreased oxygen saturation, cough and shortness of breath.  Provide calm, reassuring presence; offer clear explanation of events.  Prepare for delivery, planned or emergent, based on change in maternal-fetal status.  Recent Flowsheet Documentation  Taken 4/22/2024 1832 by Terra Norman RN  Medication Review/Management: medications reviewed  Taken 4/22/2024 1738 by Terra Norman RN  Medication Review/Management: medications reviewed  Taken 4/22/2024 1633 by Terra Norman RN  Medication Review/Management: medications reviewed  Taken 4/22/2024 1538 by Terra Norman RN  Medication Review/Management: medications reviewed  Taken 4/22/2024 1328 by Terra Norman RN  Medication  Review/Management:   medications reviewed   high-risk medications identified  Taken 2024 1238 by Terra Norman RN  Medication Review/Management: medications reviewed  Taken 2024 1143 by Terra Norman RN  Medication Review/Management: medications reviewed  Taken 2024 1030 by Terra Norman RN  Medication Review/Management:   medications reviewed   high-risk medications identified  Taken 2024 0942 by Terra Norman RN  Medication Review/Management:   medications reviewed   high-risk medications identified  Taken 2024 0821 by Terra Norman RN  Medication Review/Management:   medications reviewed   high-risk medications identified  Taken 2024 0737 by Terra Norman RN  Medication Review/Management:   medications reviewed   high-risk medications identified     Problem: Adjustment to Role Transition (Postpartum  Delivery)  Goal: Successful Maternal Role Transition  Outcome: Ongoing, Progressing     Problem: Bleeding (Postpartum  Delivery)  Goal: Hemostasis  Outcome: Ongoing, Progressing     Problem: Infection (Postpartum  Delivery)  Goal: Absence of Infection Signs and Symptoms  Outcome: Ongoing, Progressing  Intervention: Prevent or Manage Infection  Description: Optimize fluid balance, nutrition, sleep, oxygenation, glycemic control and body temperature to maximize resistance.  Maintain dressing and closed drainage system integrity to reduce the risk for infection; inspect incision as visible.  Consider splinting techniques to prevent incision dehiscence.  Discontinue prophylactic antimicrobial agent within 24 hours after procedure, as directed.  Identify potential sources of infection early; evaluate continued need and advocate for early removal (e.g., lines, devices).  Implement transmission-based precautions and isolation, as indicated, to prevent spread of infection.  Administer ordered antimicrobial therapy promptly; reassess need regularly.  Note: If  endometritis is suspected, treatment may be initiated without obtaining cultures.  Identify and manage signs of early sepsis, such as increased heart rate and decreased blood pressure, as well as changes in mental state, respiratory pattern or peripheral perfusion.  Provide fever-reduction and comfort measures.  Recent Flowsheet Documentation  Taken 2024 1538 by Terra Norman RN  Infection Management: aseptic technique maintained  Taken 2024 0737 by Terra Norman RN  Infection Management: aseptic technique maintained     Problem: Pain (Postpartum  Delivery)  Goal: Acceptable Pain Control  Outcome: Ongoing, Progressing  Intervention: Prevent or Manage Pain  Description: Determine pain management plan with patient and caregiver/family; review plan regularly.  Monitor for the presence of incisional pain; provide timely pain management interventions when present.  Use cold application, as culturally-appropriate, to the incisional area for the first 24 to 48 hours to enhance comfort.  Encourage early ambulation, when able, to help avoid gas accumulation which can add to discomfort.  Verify correct infant latch when breastfeeding to prevent nipple pain.  Consider the presence and impact of preexisting chronic pain.  Encourage patient and caregiver involvement in pain assessment, interventions and safety measures.  Evaluate risk for opioid use; individualize pharmacologic pain management plan and titrate medication to patient response.  Combine multimodal analgesia and nonpharmacologic strategies to help potentiate synergistic effects and enhance comfort (e.g., complementary therapy, diversional activity).  Provide around-the-clock dosing of pain medication to keep pain levels in control.  Manage medication-induced effects, such as constipation, nausea, vomiting.  Support and optimize psychosocial response to pain.  If engorgement occurs, encourage more frequent breastfeeding or pumping and storing  additional milk to ease discomfort. Note: Cold compresses, as culturally-appropriate, may be used if bottle-feeding.  If post-dural puncture headache identified, encourage adequate hydration and anticipate the need for epidural blood patch.  If hemorrhoids are present and painful, offer topical pain relief and sitz baths for comfort.  Recent Flowsheet Documentation  Taken 4/22/2024 1832 by Terra Norman RN  Pain Management Interventions:   pillow support provided   position adjusted   see MAR  Taken 4/22/2024 1738 by Terra Norman RN  Pain Management Interventions: (ibuprofen given)   pillow support provided   position adjusted   see MAR  Taken 4/22/2024 1633 by Terra Norman RN  Pain Management Interventions:   pillow support provided   position adjusted   see MAR  Taken 4/22/2024 1538 by Terra Norman RN  Pain Management Interventions:   pillow support provided   position adjusted   see MAR  Taken 4/22/2024 1436 by Terra Norman RN  Pain Management Interventions:   pillow support provided   position adjusted   see MAR  Taken 4/22/2024 1328 by Terra Norman RN  Pain Management Interventions: (tylenol given)   position adjusted   pillow support provided   see MAR  Taken 4/22/2024 1238 by Terra Norman RN  Pain Management Interventions:   position adjusted   pillow support provided   see MAR  Taken 4/22/2024 1143 by Terra Norman RN  Pain Management Interventions: (ibuprofen 00 mg given)   position adjusted   pillow support provided   see MAR  Taken 4/22/2024 1030 by Terra Norman RN  Pain Management Interventions:   position adjusted   pillow support provided   see MAR  Taken 4/22/2024 0942 by Terra Norman RN  Pain Management Interventions:   pillow support provided   position adjusted   see MAR  Taken 4/22/2024 0821 by Terra Norman RN  Pain Management Interventions: (oxycodone 5 mg)   pillow support provided   position adjusted   see MAR  Taken 4/22/2024 0737 by Terra Norman RN  Pain  Management Interventions: (tylenol/ibuprofen)   position adjusted   pillow support provided   pain management plan reviewed with patient/caregiver   see MAR     Problem: Postoperative Nausea and Vomiting (Postpartum  Delivery)  Goal: Nausea and Vomiting Relief  Outcome: Ongoing, Progressing     Problem: Postoperative Urinary Retention (Postpartum  Delivery)  Goal: Effective Urinary Elimination  Outcome: Ongoing, Progressing     Problem: Breastfeeding  Goal: Effective Breastfeeding  Outcome: Ongoing, Progressing   Goal Outcome Evaluation:  Plan of Care Reviewed With: patient, spouse        Progress: improving

## 2024-04-23 PROCEDURE — 25010000002 ENOXAPARIN PER 10 MG: Performed by: OBSTETRICS & GYNECOLOGY

## 2024-04-23 PROCEDURE — 0503F POSTPARTUM CARE VISIT: CPT | Performed by: OBSTETRICS & GYNECOLOGY

## 2024-04-23 RX ORDER — NIFEDIPINE 30 MG/1
30 TABLET, EXTENDED RELEASE ORAL 2 TIMES DAILY
Status: DISCONTINUED | OUTPATIENT
Start: 2024-04-23 | End: 2024-04-24 | Stop reason: HOSPADM

## 2024-04-23 RX ORDER — ACETAMINOPHEN 325 MG/1
650 TABLET ORAL EVERY 6 HOURS
Status: DISCONTINUED | OUTPATIENT
Start: 2024-04-23 | End: 2024-04-24 | Stop reason: HOSPADM

## 2024-04-23 RX ADMIN — NIFEDIPINE 30 MG: 30 TABLET, EXTENDED RELEASE ORAL at 21:35

## 2024-04-23 RX ADMIN — ACETAMINOPHEN 650 MG: 325 TABLET ORAL at 21:35

## 2024-04-23 RX ADMIN — HYDROXYZINE HYDROCHLORIDE 50 MG: 25 TABLET, FILM COATED ORAL at 09:14

## 2024-04-23 RX ADMIN — ENOXAPARIN SODIUM 40 MG: 100 INJECTION SUBCUTANEOUS at 03:50

## 2024-04-23 RX ADMIN — BUSPIRONE HYDROCHLORIDE 10 MG: 10 TABLET ORAL at 09:14

## 2024-04-23 RX ADMIN — BUPRENORPHINE 22 MG: 8 TABLET SUBLINGUAL at 09:14

## 2024-04-23 RX ADMIN — DOCUSATE SODIUM 100 MG: 100 CAPSULE, LIQUID FILLED ORAL at 21:35

## 2024-04-23 RX ADMIN — SERTRALINE HYDROCHLORIDE 50 MG: 50 TABLET ORAL at 09:14

## 2024-04-23 RX ADMIN — OXYCODONE HYDROCHLORIDE 10 MG: 10 TABLET ORAL at 22:15

## 2024-04-23 RX ADMIN — OXYCODONE 5 MG: 5 TABLET ORAL at 18:11

## 2024-04-23 RX ADMIN — ENOXAPARIN SODIUM 40 MG: 100 INJECTION SUBCUTANEOUS at 16:10

## 2024-04-23 RX ADMIN — ACETAMINOPHEN 650 MG: 325 TABLET ORAL at 09:13

## 2024-04-23 RX ADMIN — NIFEDIPINE 30 MG: 30 TABLET, EXTENDED RELEASE ORAL at 09:14

## 2024-04-23 RX ADMIN — OXYCODONE HYDROCHLORIDE 10 MG: 10 TABLET ORAL at 03:04

## 2024-04-23 RX ADMIN — Medication 1 APPLICATION: at 09:57

## 2024-04-23 RX ADMIN — HYDROXYZINE HYDROCHLORIDE 50 MG: 25 TABLET, FILM COATED ORAL at 22:15

## 2024-04-23 RX ADMIN — SIMETHICONE 80 MG: 80 TABLET, CHEWABLE ORAL at 21:35

## 2024-04-23 RX ADMIN — PRENATAL VITAMINS-IRON FUMARATE 27 MG IRON-FOLIC ACID 0.8 MG TABLET 1 TABLET: at 09:14

## 2024-04-23 RX ADMIN — BUSPIRONE HYDROCHLORIDE 10 MG: 10 TABLET ORAL at 21:35

## 2024-04-23 RX ADMIN — IBUPROFEN 600 MG: 600 TABLET, FILM COATED ORAL at 18:11

## 2024-04-23 RX ADMIN — OXYCODONE 5 MG: 5 TABLET ORAL at 10:39

## 2024-04-23 RX ADMIN — BUSPIRONE HYDROCHLORIDE 10 MG: 10 TABLET ORAL at 16:10

## 2024-04-23 RX ADMIN — IBUPROFEN 600 MG: 600 TABLET, FILM COATED ORAL at 12:24

## 2024-04-23 RX ADMIN — ACETAMINOPHEN 650 MG: 325 TABLET ORAL at 14:01

## 2024-04-23 RX ADMIN — HYDROXYZINE HYDROCHLORIDE 50 MG: 25 TABLET, FILM COATED ORAL at 14:01

## 2024-04-23 RX ADMIN — IBUPROFEN 600 MG: 600 TABLET, FILM COATED ORAL at 05:55

## 2024-04-23 NOTE — LACTATION NOTE
04/23/24 0951   Maternal Information   Date of Referral 04/23/24   Person Making Referral lactation consultant  (courtesy visit prior to discharge)   Maternal Reason for Referral   (mother exclusively pumping; provided lanolin, soft shells, gel pads, nursing pads; pumping lots of milk; no concerns at this time; to call lactation if needed.)

## 2024-04-23 NOTE — PROGRESS NOTES
[facility]   PROGRESS NOTE    Post-Op Day 5 S/P   Subjective     Patient reports:  Pain is  controlled with .  She is  ambulating. Tolerating diet. Tolerating po -- normal.  Intake -- c/o of tolerating po solids.   Voiding - without difficulty; flatus reported..  Vaginal bleeding is as much as expected.      Objective      Vitals: Vital Signs Range for the last 24 hours  Temperature: Temp:  [97.5 °F (36.4 °C)-98.1 °F (36.7 °C)] 97.6 °F (36.4 °C)   Temp Source: Temp src: Oral   BP: BP: ()/(51-83) 144/72   Pulse: Heart Rate:  [69-90] 72   Respirations: Resp:  [16-20] 16   SPO2: SpO2:  [96 %-100 %] 100 %   O2 Amount (l/min):     O2 Devices Device (Oxygen Therapy): room air   Weight:              Physical Exam    Lungs clear to auscultation bilaterally   Abdomen Soft, non-tender, normal bowel sounds; no bruits, organomegaly or masses.   Incision  healing well   Extremities extremities normal, atraumatic, no cyanosis or edema     I reviewed the patient's new clinical results.  Patient's blood pressure has elevated so we are starting her on nifedipine 30 mg XL twice a day.    Assessment & Plan        Status post  section    Preeclampsia in postpartum period      Assessment:    Vicky Melendrez is Day 5 post-partum  , Low Transverse   .       Plan:  continue post op care and add nifedipine and continue observing her blood pressures will take her staples out today .        Sandeep Pierce MD  2024  08:06 EDT

## 2024-04-24 VITALS
BODY MASS INDEX: 44.37 KG/M2 | HEART RATE: 101 BPM | HEIGHT: 61 IN | OXYGEN SATURATION: 98 % | TEMPERATURE: 97.8 F | WEIGHT: 235 LBS | SYSTOLIC BLOOD PRESSURE: 130 MMHG | RESPIRATION RATE: 16 BRPM | DIASTOLIC BLOOD PRESSURE: 77 MMHG

## 2024-04-24 PROCEDURE — 25010000002 ENOXAPARIN PER 10 MG: Performed by: OBSTETRICS & GYNECOLOGY

## 2024-04-24 PROCEDURE — 0503F POSTPARTUM CARE VISIT: CPT

## 2024-04-24 RX ORDER — IBUPROFEN 600 MG/1
600 TABLET ORAL EVERY 6 HOURS
Qty: 30 TABLET | Refills: 0 | Status: SHIPPED | OUTPATIENT
Start: 2024-04-24 | End: 2024-04-24

## 2024-04-24 RX ORDER — OXYCODONE HYDROCHLORIDE 5 MG/1
5 TABLET ORAL EVERY 8 HOURS PRN
Qty: 9 TABLET | Refills: 0 | Status: SHIPPED | OUTPATIENT
Start: 2024-04-24 | End: 2024-05-03

## 2024-04-24 RX ORDER — OXYCODONE HYDROCHLORIDE 5 MG/1
5 TABLET ORAL EVERY 8 HOURS PRN
Qty: 9 TABLET | Refills: 0 | Status: SHIPPED | OUTPATIENT
Start: 2024-04-24 | End: 2024-04-24

## 2024-04-24 RX ORDER — NIFEDIPINE 30 MG
30 TABLET, EXTENDED RELEASE ORAL 2 TIMES DAILY
Qty: 60 TABLET | Refills: 2 | Status: SHIPPED | OUTPATIENT
Start: 2024-04-24

## 2024-04-24 RX ORDER — NIFEDIPINE 30 MG
30 TABLET, EXTENDED RELEASE ORAL 2 TIMES DAILY
Qty: 60 TABLET | Refills: 2 | Status: SHIPPED | OUTPATIENT
Start: 2024-04-24 | End: 2024-04-24

## 2024-04-24 RX ORDER — IBUPROFEN 600 MG/1
600 TABLET ORAL EVERY 6 HOURS
Qty: 30 TABLET | Refills: 0 | Status: SHIPPED | OUTPATIENT
Start: 2024-04-24 | End: 2024-05-03 | Stop reason: SDUPTHER

## 2024-04-24 RX ADMIN — IBUPROFEN 600 MG: 600 TABLET, FILM COATED ORAL at 00:30

## 2024-04-24 RX ADMIN — HYDROXYZINE HYDROCHLORIDE 50 MG: 25 TABLET, FILM COATED ORAL at 09:10

## 2024-04-24 RX ADMIN — PRENATAL VITAMINS-IRON FUMARATE 27 MG IRON-FOLIC ACID 0.8 MG TABLET 1 TABLET: at 09:11

## 2024-04-24 RX ADMIN — OXYCODONE 5 MG: 5 TABLET ORAL at 12:31

## 2024-04-24 RX ADMIN — IBUPROFEN 600 MG: 600 TABLET, FILM COATED ORAL at 06:11

## 2024-04-24 RX ADMIN — OXYCODONE 5 MG: 5 TABLET ORAL at 03:25

## 2024-04-24 RX ADMIN — BUPRENORPHINE 22 MG: 8 TABLET SUBLINGUAL at 09:11

## 2024-04-24 RX ADMIN — SERTRALINE HYDROCHLORIDE 50 MG: 50 TABLET ORAL at 09:11

## 2024-04-24 RX ADMIN — ACETAMINOPHEN 650 MG: 325 TABLET ORAL at 09:10

## 2024-04-24 RX ADMIN — BUSPIRONE HYDROCHLORIDE 10 MG: 10 TABLET ORAL at 09:11

## 2024-04-24 RX ADMIN — IBUPROFEN 600 MG: 600 TABLET, FILM COATED ORAL at 12:31

## 2024-04-24 RX ADMIN — ACETAMINOPHEN 650 MG: 325 TABLET ORAL at 03:25

## 2024-04-24 RX ADMIN — ENOXAPARIN SODIUM 40 MG: 100 INJECTION SUBCUTANEOUS at 03:32

## 2024-04-24 RX ADMIN — NIFEDIPINE 30 MG: 30 TABLET, EXTENDED RELEASE ORAL at 09:10

## 2024-04-24 NOTE — DISCHARGE SUMMARY
Discharge Summary    Date of Admission: 2024  Date of Discharge:  2024      Patient: Vicky Melendrez      MR#:[id number]    Primary Surgeon/OB: Sandeep Pierce MD    Discharge Surgeon/OB: Kari    Presenting Problem/History of Present Illness  Currently pregnant [Z34.90]       Status post  section    Preeclampsia in postpartum period         Discharge Diagnosis:  section at 40w3d    Procedures:  , Low Transverse     2024    12:16 AM          Discharge Date: 2024; Discharge Time: 10:54 EDT    Early Discharge:  NO    Hospital Course  Patient is a 28 y.o. female  at 40w3d status post  section with uneventful postoperative recovery.  She developed preeclampsia in postpartum period. She received IV magnesium and was started on Nifedipine for blood pressure control. On discharge BP was normotensive with medication and she denied preeclampsia symptoms. She reported lochia as mild and pain managed. Patient was advanced to regular diet on postoperative day#1.  On discharge, ambulating, tolerating a regular diet without any difficulties and her incision is dry, clean and intact.     Infant:   female  fetus 2740 g (6 lb 0.7 oz)  with Apgar scores of 8 , 9  at five minutes.    Condition on Discharge:  Stable    Vital Signs  Temp:  [97.6 °F (36.4 °C)-98 °F (36.7 °C)] 97.8 °F (36.6 °C)  Heart Rate:  [] 73  Resp:  [16-18] 16  BP: (115-139)/(60-76) 131/60    Lab Results   Component Value Date    WBC 9.77 2024    HGB 11.3 (L) 2024    HCT 34.5 2024    MCV 88.5 2024     2024       Discharge Disposition  Home or Self Care    Discharge Medications     Discharge Medications        New Medications        Instructions Start Date   ibuprofen 600 MG tablet  Commonly known as: ADVIL,MOTRIN   600 mg, Oral, Every 6 Hours      NIFEdipine CC 30 MG 24 hr tablet  Commonly known as: ADALAT CC   30 mg, Oral, 2 Times Daily      oxyCODONE 5  MG immediate release tablet  Commonly known as: ROXICODONE   5 mg, Oral, Every 8 Hours PRN             Stop These Medications      Acetaminophen Extra Strength 500 MG tablet     albuterol sulfate  (90 Base) MCG/ACT inhaler  Commonly known as: PROVENTIL HFA;VENTOLIN HFA;PROAIR HFA     bacitracin 500 UNIT/GM ointment     Banophen 25 MG capsule  Generic drug: diphenhydrAMINE     buprenorphine 8 MG sublingual tablet SL tablet  Commonly known as: SUBUTEX     busPIRone 10 MG tablet  Commonly known as: BUSPAR     docusate sodium 100 MG capsule  Commonly known as: COLACE     famotidine 20 MG tablet  Commonly known as: PEPCID     ferrous sulfate 325 (65 FE) MG tablet     fluticasone 50 MCG/ACT nasal spray  Commonly known as: FLONASE     GNP Sleep Aid 25 MG tablet  Generic drug: doxylamine     hydrOXYzine pamoate 25 MG capsule  Commonly known as: VISTARIL     naloxone 4 MG/0.1ML nasal spray  Commonly known as: NARCAN     ondansetron ODT 4 MG disintegrating tablet  Commonly known as: ZOFRAN-ODT     Prenatal 28-0.8 MG tablet     promethazine 25 MG tablet  Commonly known as: PHENERGAN     sertraline 50 MG tablet  Commonly known as: ZOLOFT     valACYclovir 500 MG tablet  Commonly known as: Valtrex              Activity at Discharge:   Activity Instructions       Activity as Tolerated      Driving Restrictions      Type of Restriction: Driving    Driving Restrictions: No Driving (Time Limited)    Length: 2 Weeks    Lifting Restrictions      Type of Restriction: Lifting    Lifting Restrictions: Lifting Restriction (Indicate Limit)    Weight Limit (Pounds): 10    Length of Lifting Restriction: 6 weeks    Other Activity Restrictions      Type of Restriction: Other    Explain Other Restrictions: Limit steps to once per day.    Pelvic Rest      No Sex, Tampons, Swimming, Tub Baths x 6 weeks            Follow-up Appointments  No future appointments.  Additional Instructions for the Follow-ups that You Need to Schedule       Call  MD With Problems / Concerns   As directed      Instructions: Call for symptoms related to depression, foul smelling discharge, fever >/=100.4F, blood clots bigger than a golf ball, saturating through >/= 1 pad per hour, persistent headaches that do not resolve with medication, vision changes like spots or blurry vision, mid epigastric or right upper quadrant abdominal pain, shortness of breath, swelling, and/or BP >160/110. If you have any questions or concerns, feel free to contact us.    Order Comments: Instructions: Call for symptoms related to depression, foul smelling discharge, fever >/=100.4F, blood clots bigger than a golf ball, saturating through >/= 1 pad per hour, persistent headaches that do not resolve with medication, vision changes like spots or blurry vision, mid epigastric or right upper quadrant abdominal pain, shortness of breath, swelling, and/or BP >160/110. If you have any questions or concerns, feel free to contact us.         Discharge Follow-up with Specified Provider: Kari; 1 Week   As directed      To: Kari   Follow Up: 1 Week   Follow Up Details: BP check                ELAINA Noriega  04/24/24  10:54 EDT  Csd

## 2024-04-24 NOTE — CASE MANAGEMENT/SOCIAL WORK
Continued Stay Note  [facility]     Patient Name: Vicky Melendrez  MRN: [id number]  Today's Date: 4/24/2024    Admit Date: 4/18/2024    Plan: Gas card   Discharge Plan       Row Name 04/24/24 1154       Plan    Plan Gas card    Plan Comments MSW provided pt. with a gas card. No other needs or concerns. MSW is available.    Final Discharge Disposition Code 01 - home or self-care                   Discharge Codes    No documentation.                 Expected Discharge Date and Time       Expected Discharge Date Expected Discharge Time    Apr 24, 2024               QUINN Campos

## 2024-04-24 NOTE — PAYOR COMM NOTE
"Vicky Melendrez (28 y.o. Female)     [employer] A#[id number]     Remain in house.    From:Yulisa Vargas LPN, Utilization Review  Phone #[phone]  Fax #[phone]        Date of Birth   1995    Social Security Number       Address   [address]  [address] [place] [address]    Home Phone       MRN   [id number]       Worship   None    Marital Status   Legally                             Admission Date   4/18/24    Admission Type   Elective    Admitting Provider   Sandeep Pierce MD    Attending Provider   Sandeep Pierce MD    Department, Room/Bed   [facility] 4B, N437/1       Discharge Date       Discharge Disposition       Discharge Destination                                 Attending Provider: Sandeep Pierce MD    Allergies: Citalopram    Isolation: None   Infection: None   Code Status: CPR    Ht: 154.9 cm (61\")   Wt: 107 kg (235 lb)    Admission Cmt: None   Principal Problem: Currently pregnant [Z34.90]                   Active Insurance as of 4/18/2024       Primary Coverage       Payor Plan Insurance Group Employer/Plan Group    [place] [id number]       Payor Plan Address Payor Plan Phone Number Payor Plan Fax Number Effective Dates    [employer] [address] [address] [phone]  1/9/2023 - None Entered    [facility] [id number]         Subscriber Name Subscriber Birth Date Member ID       VICKY MELENDREZ 1995 [id number]                     Emergency Contacts        (Rel.) Home Phone Work Phone Mobile Phone    bernardo cody (Partner) [phone] -- [phone]              Vital Signs (last 2 days)       Date/Time Temp Temp src Pulse Resp BP Patient Position SpO2    04/24/24 0325 98 (36.7) Oral 105 16 115/63 Sitting 97    04/24/24 0031 97.6 (36.4) Oral 88 16 137/76 -- 97    04/23/24 1904 97.9 (36.6) Oral 74 16 135/67 -- 98    04/23/24 1412 97.8 (36.6) Oral 80 18 139/72 -- 98    " 04/23/24 0913 -- -- 100 -- 140/90 -- --    04/23/24 0730 -- -- -- -- 175/88 -- --    04/23/24 0729 97.5 (36.4) Oral 73 18 159/84 -- --    04/23/24 0355 97.6 (36.4) Oral 72 16 144/72 -- 100    04/23/24 0005 97.5 (36.4) Oral 76 16 145/67 -- 99    04/22/24 2123 97.5 (36.4) Oral 76 16 138/71 -- 100    04/22/24 1934 98.1 (36.7) -- -- 16 -- -- --    04/22/24 1832 -- -- 80 16 132/59 Lying --    04/22/24 1831 -- -- -- -- -- -- 98    04/22/24 1737 -- -- 69 20 134/78 Lying 97    04/22/24 1633 -- -- 81 16 132/78 Lying 99    04/22/24 1538 97.8 (36.6) Axillary 78 20 97/51 Lying 96    04/22/24 1436 -- -- 81 20 127/60 Lying 98    04/22/24 1330 -- -- 90 -- -- Lying 97    04/22/24 1329 -- -- 82 20 127/66 -- --    04/22/24 1238 -- -- 89 16 132/73 Lying 98    04/22/24 1136 97.6 (36.4) Axillary 83 20 132/83 Lying 97    04/22/24 1030 -- -- 85 20 142/73 Lying 99    04/22/24 0941 -- -- 75 20 106/57 Lying 100    04/22/24 0822 -- -- 75 20 127/70 Lying 97    04/22/24 0737 97.4 (36.3) Axillary 73 16 127/63 Lying 98    04/22/24 0641 -- -- 70 16 106/55 -- --    04/22/24 0531 -- -- 67 16 141/71 -- 98    04/22/24 0347 97.8 (36.6) Axillary 72 16 138/82 -- --    04/22/24 0346 -- -- 77 -- -- -- 99    04/22/24 0232 -- -- 69 16 127/71 -- --    04/22/24 0231 -- -- 87 -- -- -- 98    04/22/24 0125 -- -- 75 16 138/79 -- 100          Current Facility-Administered Medications   Medication Dose Route Frequency Provider Last Rate Last Admin    acetaminophen (TYLENOL) tablet 650 mg  650 mg Oral Q6H Sandeep Pierce MD   650 mg at 04/24/24 0325    aluminum-magnesium hydroxide-simethicone (MAALOX MAX) 400-400-40 MG/5ML suspension 15 mL  15 mL Oral Q4H PRN Maribel Turpin MD        Or    calcium carbonate (TUMS) chewable tablet 500 mg (200 mg elemental)  1 tablet Oral Q4H PRN Maribel Turpin MD        buprenorphine (SUBUTEX) SL tablet 22 mg  22 mg Sublingual Daily Maribel Turpin MD   22 mg at 04/23/24 0914    busPIRone (BUSPAR) tablet 10 mg  10 mg Oral TID High,  Max VILLALOBOS MD   10 mg at 04/23/24 2135    carboprost (HEMABATE) injection 250 mcg  250 mcg Intramuscular PRN Maribel Turpin MD        ceFAZolin Sodium (ANCEF) 2 g injection  - ADS Override Pull             docusate sodium (COLACE) capsule 100 mg  100 mg Oral BID PRN Maribel Turpin MD   100 mg at 04/23/24 2135    Enoxaparin Sodium (LOVENOX) syringe 40 mg  40 mg Subcutaneous Q12H Maribel Turpin MD   40 mg at 04/24/24 0332    ePHEDrine Sulfate (Pressors) 5 MG/ML injection  - ADS Override Pull             Hydrocortisone (Perianal) (ANUSOL-HC) 2.5 % rectal cream 1 Application  1 Application Rectal PRN Maribel Turpin MD        hydrOXYzine (ATARAX) tablet 50 mg  50 mg Oral TID Max Cesar MD   50 mg at 04/23/24 2215    ibuprofen (ADVIL,MOTRIN) tablet 600 mg  600 mg Oral Q6H Maribel Turpin MD   600 mg at 04/24/24 0611    lanolin topical 1 Application  1 Application Topical Q1H PRN Maribel Turpin MD   1 Application at 04/23/24 0957    methylergonovine (METHERGINE) injection 200 mcg  200 mcg Intramuscular Once PRN Maribel Turpin MD        metoclopramide (REGLAN) tablet 10 mg  10 mg Oral Q6H PRN Maribel Turpin MD        miSOPROStol (CYTOTEC) tablet 600 mcg  600 mcg Oral PRN Maribel Turpin MD        NIFEdipine XL (PROCARDIA XL) 24 hr tablet 30 mg  30 mg Oral BID Sandeep Pierce MD   30 mg at 04/23/24 2135    oxyCODONE (ROXICODONE) immediate release tablet 5 mg  5 mg Oral Q4H PRN Tiffany Groves MD   5 mg at 04/24/24 0325    Or    oxyCODONE (ROXICODONE) immediate release tablet 10 mg  10 mg Oral Q4H PRN Tiffany Groves MD   10 mg at 04/23/24 2215    oxytocin (PITOCIN) 30 units in 0.9% sodium chloride 500 mL (premix)  125 mL/hr Intravenous Once PRN Maribel Turpin MD        prenatal vitamin tablet 1 tablet  1 tablet Oral Daily Maribel Turpin MD   1 tablet at 04/23/24 0914    sertraline (ZOLOFT) tablet 50 mg  50 mg Oral Daily Max Cesar MD   50 mg at 04/23/24 0914    simethicone (MYLICON)  "chewable tablet 80 mg  80 mg Oral 4x Daily PRN Maribel Turpin MD   80 mg at 04/23/24 2135    sodium chloride 0.9 % infusion  - ADS Override Pull              Orders (last 48 hrs)        Start     Ordered    04/23/24 0900  acetaminophen (TYLENOL) tablet 650 mg  Every 6 Hours         04/23/24 0745    04/23/24 0900  NIFEdipine XL (PROCARDIA XL) 24 hr tablet 30 mg  2 Times Daily         04/23/24 0758    04/22/24 2259  Notify Physician  Until Discontinued         04/22/24 2258 04/22/24 1243  acetaminophen (TYLENOL) tablet 650 mg  Every 4 Hours PRN,   Status:  Discontinued        Placed in \"Followed by\" Linked Group    04/22/24 1243    04/22/24 1000  Case Management  Consult  Once        Provider:  (Not yet assigned)    04/22/24 0042    04/22/24 0900  sertraline (ZOLOFT) tablet 50 mg  Daily         04/21/24 2004 04/22/24 0900  hydrOXYzine (ATARAX) tablet 50 mg  3 times daily         04/21/24 2155 04/21/24 2100  busPIRone (BUSPAR) tablet 10 mg  3 Times Daily         04/21/24 2004 04/21/24 2030  lactated ringers infusion  Continuous,   Status:  Discontinued         04/21/24 1935    04/21/24 1930  magnesium sulfate 20 GM/500ML infusion  Continuous,   Status:  Discontinued         04/21/24 1838    04/20/24 1200  ibuprofen (ADVIL,MOTRIN) tablet 600 mg  Every 6 Hours        Placed in \"Followed by\" Linked Group    04/19/24 0339    04/20/24 0900  acetaminophen (TYLENOL) tablet 650 mg  Every 6 Hours,   Status:  Discontinued        Placed in \"Followed by\" Linked Group    04/19/24 0339    04/20/24 0400  Enoxaparin Sodium (LOVENOX) syringe 40 mg  Every 12 Hours         04/19/24 0339 04/19/24 0900  buprenorphine (SUBUTEX) SL tablet 22 mg  Daily         04/19/24 0339 04/19/24 0900  prenatal vitamin tablet 1 tablet  Daily         04/19/24 0339 04/19/24 0800  Ambulate Patient  2 Times Daily      Comments: After anesthesia wears off.    04/19/24 0339 04/19/24 0345  metoclopramide (REGLAN) tablet 10 " "mg  Every 6 Hours PRN         04/19/24 0339    04/19/24 0336  I/O  Every Shift       04/19/24 0339    04/19/24 0336  Ambulate Patient 3-5 times per day (with or without Beth)  Every Shift       04/19/24 0339 04/19/24 0335  lanolin topical 1 Application  Every 1 Hour PRN         04/19/24 0339    04/19/24 0335  carboprost (HEMABATE) injection 250 mcg  As Needed         04/19/24 0339    04/19/24 0335  miSOPROStol (CYTOTEC) tablet 600 mcg  As Needed         04/19/24 0339    04/19/24 0335  methylergonovine (METHERGINE) injection 200 mcg  Once As Needed         04/19/24 0339 04/19/24 0335  Hydrocortisone (Perianal) (ANUSOL-HC) 2.5 % rectal cream 1 Application  As Needed         04/19/24 0339 04/19/24 0335  aluminum-magnesium hydroxide-simethicone (MAALOX MAX) 400-400-40 MG/5ML suspension 15 mL  Every 4 Hours PRN        Placed in \"Or\" Linked Group    04/19/24 0339    04/19/24 0335  calcium carbonate (TUMS) chewable tablet 500 mg (200 mg elemental)  Every 4 Hours PRN        Placed in \"Or\" Linked Group    04/19/24 0339    04/19/24 0335  oxytocin (PITOCIN) 30 units in 0.9% sodium chloride 500 mL (premix)  Once As Needed         04/19/24 0339 04/19/24 0335  oxyCODONE (ROXICODONE) immediate release tablet 5 mg  Every 4 Hours PRN        Placed in \"Or\" Linked Group    04/19/24 0339    04/19/24 0335  oxyCODONE (ROXICODONE) immediate release tablet 10 mg  Every 4 Hours PRN        Placed in \"Or\" Linked Group    04/19/24 0339    04/19/24 0335  docusate sodium (COLACE) capsule 100 mg  2 Times Daily PRN         04/19/24 0339    04/19/24 0335  simethicone (MYLICON) chewable tablet 80 mg  4 Times Daily PRN         04/19/24 0339    04/18/24 0000  Provide Patient With ERAS Instruction Handout         04/18/24 2356    04/18/24 0000  Review PO PM Tylenol         04/18/24 2356    04/18/24 0000  Review Gatorade AM (Time Frame Prior to Surgery)         04/18/24 2356    Unscheduled  Blood Gas, Arterial -With Co-Ox Panel: Yes  As " Needed       04/19/24 0131    Unscheduled  Up with Assistance  As Needed       04/19/24 0339    Unscheduled  Bladder Scan if Patient Unable to Void 4-6 Hours After Catheter Removal  As Needed         04/19/24 0339    Unscheduled  Straight Cath Every 4-6 Hours As Needed If Patient is Unable to Void After 4-6 Hours, Bladder Scan Volume is Greater Than 500mL & Patient Has Symptoms of Bladder Discomfort / Distention  As Needed       04/19/24 0339    Unscheduled  Schedule / Prompt Voiding For Patients With Urinary Incontinence  As Needed       04/19/24 0339    Unscheduled  Wound Care  As Needed      Comments: Postop day 1. Remove dressing and leave incision open to air.    04/19/24 0339    Unscheduled  Chewing Gum  As Needed       04/19/24 0339    Unscheduled  Warm compress  As Needed       04/19/24 0339    Unscheduled  Apply ace wrap, tight bra, or binder  As Needed       04/19/24 0339    Unscheduled  Apply ice packs  As Needed       04/19/24 0339    Signed and Held  Notify Physician (specified)  Until Discontinued,   Status:  Canceled         Signed and Held    Signed and Held  Vital Signs Per hospital policy  Per Hospital Policy,   Status:  Canceled         Signed and Held    Signed and Held  Up with Assistance  As Needed,   Status:  Canceled         Signed and Held    Signed and Held  Fundal & Lochia Check  Per Order Details,   Status:  Canceled      Comments: Every 15 Minutes x4, Then Every 30 Minutes x2, Then Every Shift    Signed and Held    Signed and Held  Fundal & Lochia Check  Every Shift,   Status:  Canceled       Signed and Held    Signed and Held  Apply Ice to Perineum  As Needed,   Status:  Canceled         Signed and Held    Signed and Held  Bladder Assessment  As Needed,   Status:  Canceled         Signed and Held    Signed and Held  Diet: Regular/House; Fluid Consistency: Thin (IDDSI 0)  Diet Effective Now,   Status:  Canceled         Signed and Held    Signed and Held  oxytocin (PITOCIN) 30 units in  "0.9% sodium chloride 500 mL (premix)  Once,   Status:  Canceled        Placed in \"Followed by\" Linked Group    Signed and Held    Signed and Held  oxytocin (PITOCIN) 30 units in 0.9% sodium chloride 500 mL (premix)  Continuous,   Status:  Canceled        Placed in \"Followed by\" Linked Group    Signed and Held    Signed and Held  acetaminophen (TYLENOL) tablet 650 mg  Every 4 Hours PRN,   Status:  Canceled         Signed and Held    Signed and Held  morphine injection 2 mg  Every 2 Hours PRN,   Status:  Canceled         Signed and Held    Signed and Held  oxyCODONE-acetaminophen (PERCOCET) 7.5-325 MG per tablet 2 tablet  Every 4 Hours PRN,   Status:  Canceled         Signed and Held    Signed and Held  morphine injection 2 mg  Every 2 Hours PRN,   Status:  Canceled         Signed and Held    Signed and Held  methylergonovine (METHERGINE) injection 200 mcg  Once As Needed,   Status:  Canceled         Signed and Held    Signed and Held  carboprost (HEMABATE) injection 250 mcg  As Needed,   Status:  Canceled         Signed and Held    Signed and Held  miSOPROStol (CYTOTEC) tablet 800 mcg  As Needed,   Status:  Canceled         Signed and Held    Signed and Held  promethazine (PHENERGAN) suppository 12.5 mg  Every 6 Hours PRN,   Status:  Canceled        Placed in \"Or\" Linked Group    Signed and Held    Signed and Held  promethazine (PHENERGAN) tablet 12.5 mg  Every 6 Hours PRN,   Status:  Canceled        Placed in \"Or\" Linked Group    Signed and Held    Signed and Held  Transfer to postpartum when discharge criteria met.  Until Discontinued,   Status:  Canceled         Signed and Held    Signed and Held  methylergonovine (METHERGINE) injection 200 mcg  Once As Needed,   Status:  Canceled         Signed and Held    Signed and Held  carboprost (HEMABATE) injection 250 mcg  Every 15 Minutes PRN,   Status:  Canceled         Signed and Held    Signed and Held  miSOPROStol (CYTOTEC) tablet 800 mcg  Once As Needed,   Status:  " "Canceled         Signed and Held    Signed and Held  diphenhydrAMINE (BENADRYL) capsule 25 mg  Every 4 Hours PRN,   Status:  Canceled        Placed in \"Or\" Linked Group    Signed and Held    Signed and Held  diphenhydrAMINE (BENADRYL) injection 25 mg  Once As Needed,   Status:  Canceled        Placed in \"Or\" Linked Group    Signed and Held    Signed and Held  diphenhydrAMINE (BENADRYL) injection 25 mg  Every 4 Hours PRN,   Status:  Canceled        Placed in \"Or\" Linked Group    Signed and Held                     Physician Progress Notes (last 48 hours)        Sandeep Pierce MD at 24 0806          [facility]   PROGRESS NOTE    Post-Op Day 5 S/P   Subjective     Patient reports:  Pain is  controlled with .  She is  ambulating. Tolerating diet. Tolerating po -- normal.  Intake -- c/o of tolerating po solids.   Voiding - without difficulty; flatus reported..  Vaginal bleeding is as much as expected.      Objective      Vitals: Vital Signs Range for the last 24 hours  Temperature: Temp:  [97.5 °F (36.4 °C)-98.1 °F (36.7 °C)] 97.6 °F (36.4 °C)   Temp Source: Temp src: Oral   BP: BP: ()/(51-83) 144/72   Pulse: Heart Rate:  [69-90] 72   Respirations: Resp:  [16-20] 16   SPO2: SpO2:  [96 %-100 %] 100 %   O2 Amount (l/min):     O2 Devices Device (Oxygen Therapy): room air   Weight:              Physical Exam    Lungs clear to auscultation bilaterally   Abdomen Soft, non-tender, normal bowel sounds; no bruits, organomegaly or masses.   Incision  healing well   Extremities extremities normal, atraumatic, no cyanosis or edema     I reviewed the patient's new clinical results.  Patient's blood pressure has elevated so we are starting her on nifedipine 30 mg XL twice a day.    Assessment & Plan        Status post  section    Preeclampsia in postpartum period      Assessment:    Vicky Melendrez is Day 5 post-partum  , Low Transverse   .       Plan:  continue post op care and " add nifedipine and continue observing her blood pressures will take her staples out today .        Sandeep Pierce MD  2024  08:06 EDT      Electronically signed by Sandeep Pierce MD at 24 0807       Sandeep Pierce MD at 24 1440          [facility]   PROGRESS NOTE    Post-Op Day 4 S/P   Subjective     Patient reports:  Pain is  controlled with .  She is  ambulating. Tolerating diet. Tolerating po -- normal.  Intake -- c/o of tolerating po solids.   Voiding - without difficulty; flatus reported..  Vaginal bleeding is as much as expected.      Objective      Vitals: Vital Signs Range for the last 24 hours  Temperature: Temp:  [97.4 °F (36.3 °C)-98.4 °F (36.9 °C)] 97.6 °F (36.4 °C)   Temp Source: Temp src: Axillary   BP: BP: (106-187)/(55-99) 127/60   Pulse: Heart Rate:  [65-99] 81   Respirations: Resp:  [16-20] 20   SPO2: SpO2:  [94 %-100 %] 98 %   O2 Amount (l/min):     O2 Devices Device (Oxygen Therapy): room air   Weight:              Physical Exam    Lungs clear to auscultation bilaterally   Abdomen Soft, non-tender, normal bowel sounds; no bruits, organomegaly or masses.   Incision  healing well   Extremities extremities normal, atraumatic, no cyanosis or edema     I reviewed the patient's new clinical results.  Patient is on magnesium sulfate at this time and her blood pressures have been normal.  We will stop her magnesium tonight at the 24-hour ale and transfer back up to mother-baby.  Will continue to watch her blood pressures.    Assessment & Plan        Status post  section    Preeclampsia in postpartum period      Assessment:    Vicky Melendrez is Day 4  post-partum  , Low Transverse   .       Plan:  continue post op care.        Sandeep Pierce MD  2024  14:40 EDT      Electronically signed by Sandeep Pierce MD at 24 [phone]

## 2024-04-24 NOTE — LACTATION NOTE
04/24/24 0935   Maternal Information   Date of Referral 04/24/24   Person Making Referral lactation consultant  (courtesy visit prior to discharge)   Maternal Reason for Referral   (mother reporting well with pumping for infant in NICU; no needs/concerns at this time; to call lactation PRN.)     No concerns or needs at this time.

## 2024-04-29 ENCOUNTER — TELEPHONE (OUTPATIENT)
Dept: OBSTETRICS AND GYNECOLOGY | Facility: CLINIC | Age: 29
End: 2024-04-29
Payer: MEDICAID

## 2024-04-29 NOTE — TELEPHONE ENCOUNTER
Spoke with pt, discussed at this point 10 days PP, she can take the stairs as she tolerates. Pt verbalizes understanding.

## 2024-04-29 NOTE — TELEPHONE ENCOUNTER
[facility]      [phone]    DELIVERED 4/19/24    WAS TOLD TO LIMIT USING STAIRS, PT WANTED TO KNOW HOW LONG IS THE RESTRICTION SUPPOSE TO LAST?

## 2024-05-03 ENCOUNTER — POSTPARTUM VISIT (OUTPATIENT)
Dept: OBSTETRICS AND GYNECOLOGY | Facility: CLINIC | Age: 29
End: 2024-05-03
Payer: MEDICAID

## 2024-05-03 ENCOUNTER — MATERNAL SCREENING (OUTPATIENT)
Dept: CALL CENTER | Facility: HOSPITAL | Age: 29
End: 2024-05-03
Payer: MEDICAID

## 2024-05-03 VITALS
BODY MASS INDEX: 41.16 KG/M2 | SYSTOLIC BLOOD PRESSURE: 124 MMHG | DIASTOLIC BLOOD PRESSURE: 84 MMHG | WEIGHT: 218 LBS | HEIGHT: 61 IN

## 2024-05-03 DIAGNOSIS — F41.9 ANXIETY AND DEPRESSION: Primary | ICD-10-CM

## 2024-05-03 DIAGNOSIS — F32.A ANXIETY AND DEPRESSION: Primary | ICD-10-CM

## 2024-05-03 RX ORDER — IBUPROFEN 600 MG/1
600 TABLET ORAL EVERY 6 HOURS
Qty: 30 TABLET | Refills: 0 | Status: SHIPPED | OUTPATIENT
Start: 2024-05-03

## 2024-05-03 RX ORDER — BUSPIRONE HYDROCHLORIDE 5 MG/1
5 TABLET ORAL 3 TIMES DAILY
COMMUNITY

## 2024-05-03 RX ORDER — ACETAMINOPHEN 325 MG/1
650 TABLET ORAL EVERY 4 HOURS PRN
Qty: 100 TABLET | Refills: 0 | Status: SHIPPED | OUTPATIENT
Start: 2024-05-03

## 2024-05-03 RX ORDER — SERTRALINE HYDROCHLORIDE 25 MG/1
25 TABLET, FILM COATED ORAL DAILY
COMMUNITY

## 2024-05-03 RX ORDER — BUPRENORPHINE 8 MG/1
TABLET SUBLINGUAL EVERY 24 HOURS
COMMUNITY
Start: 2024-03-27

## 2024-05-03 RX ORDER — HYDROXYZINE PAMOATE 25 MG/1
25 CAPSULE ORAL 3 TIMES DAILY PRN
COMMUNITY

## 2024-05-03 NOTE — OUTREACH NOTE
Maternal Screening Survey      Flowsheet Row Responses   Facility patient discharged from? [facility]   Attempt successful? No   Unsuccessful attempts Attempt 1              Wilfred GIBBS - Registered Nurse

## 2024-05-03 NOTE — OUTREACH NOTE
Maternal Screening Survey      Flowsheet Row Responses   Facility patient discharged from? [facility]   Attempt successful? No   Unsuccessful attempts Attempt 2  [Presently at GYN appt. Will reschedule call for tomorrow]              Wilfred GIBBS - Registered Nurse

## 2024-05-03 NOTE — PROGRESS NOTES
Chief Complaint   Patient presents with    Postpartum Care       Postpartum Visit         Vicky Melendrez is a 28 y.o.  who presents today for a 2 week(s) postpartum check.      C/S:  non-reassuring fetal status      , Low Transverse    Information for the patient's :  Joanna Melendrez [[id number]]   [birth year]24   female   Joanna Melendrez   2740 g (6 lb 0.7 oz)   Gestational Age: 40w3d      Baby To NICU for 10 days  Delivering MD: Sandeep Pierce MD.    Pregnancy complications:  postpartum pre-eclampsia     Patient reports her incision is clean, dry, intact. Patient describes vaginal bleeding as light. She is pumping and supplementing with breast milk. She would like to discuss the following complaints today: none.    She states she has not been checking BP at home. She spoke with someone yesterday who is mailing her a BP monitor so she can check BP. Denies preeclampsia symptoms.     She desires to discuss a tubal ligation for contraception. Could not perform with c/section b/c had not been 30 days prior to sterilization consent.     Patient reports concerns for postpartum depression/anxiety. Patient denies  suicidal or homicidal ideation. Her postpartum depression screening questionnaire: 15. She is currently being treated with Zoloft, Buspar, and Vistaril. She feels it is not helping.  Sees therapist at [facility] once monthly. Next visit is in 2 weeks. Lives at [facility] in [place], [place]. Baby is still in the NICU. [employer] transports her to see baby daily from 4-6pm. She states begin away from baby is the cause of worsening anxiety and depression. She states she informed [facility] staff about uncontrolled anxiety and depression and they told her she would need to see an outside provider for management and medication adjustment.     The additional following portions of the patient's history were reviewed and updated as appropriate: allergies,  "current medications, past family history, past medical history, past social history, past surgical history, and problem list.      Review of Systems   Eyes:  Negative for visual disturbance.   Respiratory: Negative.  Negative for shortness of breath.    Cardiovascular: Negative.  Negative for chest pain.   Gastrointestinal: Negative.  Negative for abdominal distention, abdominal pain and constipation.   Genitourinary:  Positive for vaginal bleeding. Negative for breast discharge, breast lump, breast pain, dysuria, pelvic pain, pelvic pressure, urinary incontinence, vaginal discharge and vaginal pain.   Neurological:  Negative for headache.   Psychiatric/Behavioral:  Positive for depressed mood. Negative for suicidal ideas. The patient is nervous/anxious.         Denies SI/HI       I have reviewed and agree with the HPI, ROS, and historical information as entered above. Supriya Peres, APRN      Objective   /84   Ht 154.9 cm (61\")   Wt 98.9 kg (218 lb)   Breastfeeding Yes   BMI 41.19 kg/m²     Physical Exam  Vitals and nursing note reviewed.   Constitutional:       General: She is not in acute distress.     Appearance: Normal appearance. She is obese. She is not ill-appearing or toxic-appearing.   HENT:      Head: Normocephalic and atraumatic.   Pulmonary:      Effort: Pulmonary effort is normal.   Abdominal:      Palpations: Abdomen is soft. There is no mass.      Tenderness: There is no abdominal tenderness.      Hernia: No hernia is present.      Comments: LTCS incision well approximated; no drainage, bleeding, or redness.      Neurological:      Mental Status: She is alert and oriented to person, place, and time.   Psychiatric:         Attention and Perception: Attention normal.         Mood and Affect: Mood is not anxious or depressed.         Speech: Speech normal.         Behavior: Behavior normal.         Thought Content: Thought content normal. Thought content does not include homicidal or " suicidal ideation. Thought content does not include homicidal or suicidal plan.          Assessment and Plan    Problem List Items Addressed This Visit    None  Visit Diagnoses       Anxiety and depression    -  Primary    Relevant Medications    sertraline (ZOLOFT) 25 MG tablet    hydrOXYzine pamoate (VISTARIL) 25 MG capsule    busPIRone (BUSPAR) 5 MG tablet    Other Relevant Orders    Ambulatory Referral to Behavioral Health    Postpartum care following  delivery        Relevant Medications    ibuprofen (ADVIL,MOTRIN) 600 MG tablet    acetaminophen (Tylenol) 325 MG tablet    Other Relevant Orders    Ambulatory Referral to Behavioral Health            S/p , 2 week(s) postpartum.  Doing well.    Continued pelvic rest with a return to driving and light physical activity.  Baby doing well in NICU.  Bottle feeding going well.  Signs of postpartum depression - discussed options.  referral ordered for medication management and other interventions if needed.   Contraception: contraceptive methods: Abstinence. Would like salpingectomy for contraception. Will discuss with OP next visit.   Apply interdry cloth to abdominal fold.   Weaning instructions reviewed with patient for BP medication. Advised to check BP prior to taking BP medication.   Hypotension, salpingectomy, Tubal education included in patient instructions.   Return in about 4 weeks (around 2024) for 6 week PP visitKari.    Supriya Peres, APRN  2024

## 2024-05-04 ENCOUNTER — MATERNAL SCREENING (OUTPATIENT)
Dept: CALL CENTER | Facility: HOSPITAL | Age: 29
End: 2024-05-04
Payer: MEDICAID

## 2024-05-04 NOTE — OUTREACH NOTE
Maternal Screening Survey      Flowsheet Row Responses   Facility patient discharged from? [facility]   Attempt successful? No   Unsuccessful attempts Attempt 3   Revoke Decline to participate              FLORINA LINDSEY - Registered Nurse

## 2024-06-11 ENCOUNTER — TELEPHONE (OUTPATIENT)
Dept: OBSTETRICS AND GYNECOLOGY | Facility: CLINIC | Age: 29
End: 2024-06-11
Payer: MEDICAID

## 2024-06-11 NOTE — TELEPHONE ENCOUNTER
Caller: KRISSY    Relationship: Provider    Best call back number: [phone]    Who are you requesting to speak with (clinical staff, SUJATHA CHEN, APRN      What was the call regarding: KRISSY WITH [employer] CALLED STATED THAT REFERRAL IS MISSING INFORMATION.  KRISSY ADVISE THAT ALL THAT WAS ON IT WAS THE NAME AND ,  PLEASE RESEND WITH NAME, , CONTACT PHONE NUMBER. COULDN'T CONTACT PATIENT.  PLEASE FAX TO  [phone]

## 2025-03-29 NOTE — PROGRESS NOTES
OB FOLLOW UP  CC- Here for care of pregnancy        Vicky Melendrez is a 28 y.o.  36w0d patient being seen today for her obstetrical follow up visit. Patient reports BH contractions, increased swelling in bilateral feet/ankles. Patient is requesting RF on albuterol inhaler and zoloft today.      Her prenatal care is complicated by (and status) :  There is no problem list on file for this patient.      GBS Status: Done Today. She is not allergic to PCN.    Allergies   Allergen Reactions    Citalopram Anaphylaxis, Hives and Rash          Her Delivery Plan is: Undecided    US today: no  Non Stress Test: No.    ROS -   Patient Denies: Loss of Fluid, Vaginal Spotting, and Vision Changes  Fetal Movement : normal  All other systems reviewed and are negative.       The additional following portions of the patient's history were reviewed and updated as appropriate: allergies and current medications.    I have reviewed and agree with the HPI, ROS, and historical information as entered above. Sandeep Pierce MD        EXAM:     Prenatal Vitals  BP: 126/68  Weight: 106 kg (234 lb)                              Assessment and Plan    Problem List Items Addressed This Visit    None  Visit Diagnoses       Prenatal care, subsequent pregnancy, third trimester    -  Primary    Relevant Orders    POC Urinalysis Dipstick          Swelling but no other signs of disease and she will wear supprt hose   Pregnancy at 36w0d  Fetal status reassuring.   Reviewed Pre-eclampsia signs/symptoms  Discussed options for IOL. Patient desires spontaneous labor. Would like to postpone an IOL unless medically indicated.   Delivery options reviewed with patient  Signs of labor reviewed  Kick counts reviewed  Activity and Exercise discussed.  Return in about 1 week (around 3/26/2024).    Sandeep Pierce MD  2024    
Negative

## (undated) DEVICE — ADHS LIQ MASTISOL 2/3ML

## (undated) DEVICE — APPL CHLORAPREP TINTED 26ML TEAL

## (undated) DEVICE — MAT PREVALON MOBL TRANSFR AIR W/PAD REPROC 39X81IN

## (undated) DEVICE — SUT GUT CHRM 1 CTX 36IN 905H

## (undated) DEVICE — PROXIMATE RH ROTATING HEAD SKIN STAPLERS (35 WIDE) CONTAINS 35 STAINLESS STEEL STAPLES: Brand: PROXIMATE

## (undated) DEVICE — TBG PENCL TELESCP MEGADYNE SMOKE EVAC 10FT

## (undated) DEVICE — SOL IRR H2O BTL 1000ML STRL

## (undated) DEVICE — SOL IRR NACL 0.9PCT BT 1000ML

## (undated) DEVICE — 3M™ STERI-STRIP™ REINFORCED ADHESIVE SKIN CLOSURES, R1547, 1/2 IN X 4 IN (12 MM X 100 MM), 6 STRIPS/ENVELOPE: Brand: 3M™ STERI-STRIP™

## (undated) DEVICE — PATIENT RETURN ELECTRODE, SINGLE-USE, CONTACT QUALITY MONITORING, ADULT, WITH 9FT CORD, FOR PATIENTS WEIGING OVER 33LBS. (15KG): Brand: MEGADYNE

## (undated) DEVICE — COATED VICRYL  (POLYGLACTIN 910) SUTURE, VIOLET BRAIDED, STERILE, SYNTHETIC ABSORBABLE SUTURE: Brand: COATED VICRYL

## (undated) DEVICE — 4-PORT MANIFOLD: Brand: NEPTUNE 2

## (undated) DEVICE — PK C/SECT 10

## (undated) DEVICE — SUT PLAIN  3/0 CT1 27IN 842H

## (undated) DEVICE — CLTH CLENS READYCLEANSE PERI CARE PK/5

## (undated) DEVICE — TRAP FLD MINIVAC MEGADYNE 100ML

## (undated) DEVICE — BOWL UTIL STRL 32OZ

## (undated) DEVICE — SUT VIC 2/0 CT1 27IN J339H BX/36

## (undated) DEVICE — GLV SURG BIOGEL LTX PF 7 1/2

## (undated) DEVICE — TRY SPINE BLCK WHITACRE 25G 3X5IN

## (undated) DEVICE — SUT VIC 3/0 PS2 27IN J427H